# Patient Record
Sex: MALE | Race: BLACK OR AFRICAN AMERICAN | Employment: UNEMPLOYED | URBAN - METROPOLITAN AREA
[De-identification: names, ages, dates, MRNs, and addresses within clinical notes are randomized per-mention and may not be internally consistent; named-entity substitution may affect disease eponyms.]

---

## 2021-03-16 ENCOUNTER — HOSPITAL ENCOUNTER (EMERGENCY)
Age: 41
Discharge: HOME | End: 2021-03-16
Attending: EMERGENCY MEDICINE
Payer: COMMERCIAL

## 2021-03-16 VITALS
DIASTOLIC BLOOD PRESSURE: 87 MMHG | TEMPERATURE: 98.6 F | HEART RATE: 112 BPM | SYSTOLIC BLOOD PRESSURE: 154 MMHG | OXYGEN SATURATION: 98 % | RESPIRATION RATE: 18 BRPM

## 2021-03-16 DIAGNOSIS — Z91.199 NON COMPLIANCE WITH MEDICAL TREATMENT: Primary | ICD-10-CM

## 2021-03-16 DIAGNOSIS — F20.9 SCHIZOPHRENIA, UNSPECIFIED TYPE (HCC): ICD-10-CM

## 2021-03-16 DIAGNOSIS — F41.1 ANXIETY STATE: ICD-10-CM

## 2021-03-16 LAB
ACETAMINOPHEN LEVEL: <5 MCG/ML (ref 10–30)
ALBUMIN SERPL-MCNC: 4 G/DL (ref 3.5–5.2)
ALP BLD-CCNC: 118 U/L (ref 40–129)
ALT SERPL-CCNC: 11 U/L (ref 0–40)
AMPHETAMINE SCREEN, URINE: NOT DETECTED
ANION GAP SERPL CALCULATED.3IONS-SCNC: 11 MMOL/L (ref 7–16)
AST SERPL-CCNC: 20 U/L (ref 0–39)
BARBITURATE SCREEN URINE: NOT DETECTED
BASOPHILS ABSOLUTE: 0.04 E9/L (ref 0–0.2)
BASOPHILS RELATIVE PERCENT: 0.4 % (ref 0–2)
BENZODIAZEPINE SCREEN, URINE: NOT DETECTED
BILIRUB SERPL-MCNC: 0.8 MG/DL (ref 0–1.2)
BUN BLDV-MCNC: 12 MG/DL (ref 6–20)
CALCIUM SERPL-MCNC: 9 MG/DL (ref 8.6–10.2)
CANNABINOID SCREEN URINE: POSITIVE
CHLORIDE BLD-SCNC: 103 MMOL/L (ref 98–107)
CO2: 21 MMOL/L (ref 22–29)
COCAINE METABOLITE SCREEN URINE: NOT DETECTED
CREAT SERPL-MCNC: 1 MG/DL (ref 0.7–1.2)
EOSINOPHILS ABSOLUTE: 0.03 E9/L (ref 0.05–0.5)
EOSINOPHILS RELATIVE PERCENT: 0.3 % (ref 0–6)
ETHANOL: <10 MG/DL (ref 0–0.08)
FENTANYL SCREEN, URINE: NOT DETECTED
GFR AFRICAN AMERICAN: >60
GFR NON-AFRICAN AMERICAN: >60 ML/MIN/1.73
GLUCOSE BLD-MCNC: 128 MG/DL (ref 74–99)
HCT VFR BLD CALC: 39.3 % (ref 37–54)
HEMOGLOBIN: 12.3 G/DL (ref 12.5–16.5)
IMMATURE GRANULOCYTES #: 0.03 E9/L
IMMATURE GRANULOCYTES %: 0.3 % (ref 0–5)
LYMPHOCYTES ABSOLUTE: 1.42 E9/L (ref 1.5–4)
LYMPHOCYTES RELATIVE PERCENT: 15.6 % (ref 20–42)
Lab: ABNORMAL
MCH RBC QN AUTO: 23.7 PG (ref 26–35)
MCHC RBC AUTO-ENTMCNC: 31.3 % (ref 32–34.5)
MCV RBC AUTO: 75.9 FL (ref 80–99.9)
METHADONE SCREEN, URINE: NOT DETECTED
MONOCYTES ABSOLUTE: 0.83 E9/L (ref 0.1–0.95)
MONOCYTES RELATIVE PERCENT: 9.1 % (ref 2–12)
NEUTROPHILS ABSOLUTE: 6.76 E9/L (ref 1.8–7.3)
NEUTROPHILS RELATIVE PERCENT: 74.3 % (ref 43–80)
OPIATE SCREEN URINE: NOT DETECTED
OXYCODONE URINE: NOT DETECTED
PDW BLD-RTO: 13.9 FL (ref 11.5–15)
PHENCYCLIDINE SCREEN URINE: NOT DETECTED
PLATELET # BLD: 298 E9/L (ref 130–450)
PMV BLD AUTO: 9.5 FL (ref 7–12)
POTASSIUM REFLEX MAGNESIUM: 3.7 MMOL/L (ref 3.5–5)
RBC # BLD: 5.18 E12/L (ref 3.8–5.8)
SALICYLATE, SERUM: <0.3 MG/DL (ref 0–30)
SARS-COV-2, NAAT: NOT DETECTED
SODIUM BLD-SCNC: 135 MMOL/L (ref 132–146)
TOTAL PROTEIN: 7.3 G/DL (ref 6.4–8.3)
TRICYCLIC ANTIDEPRESSANTS SCREEN SERUM: NEGATIVE NG/ML
WBC # BLD: 9.1 E9/L (ref 4.5–11.5)

## 2021-03-16 PROCEDURE — 82077 ASSAY SPEC XCP UR&BREATH IA: CPT

## 2021-03-16 PROCEDURE — 80307 DRUG TEST PRSMV CHEM ANLYZR: CPT

## 2021-03-16 PROCEDURE — 99283 EMERGENCY DEPT VISIT LOW MDM: CPT

## 2021-03-16 PROCEDURE — 80143 DRUG ASSAY ACETAMINOPHEN: CPT

## 2021-03-16 PROCEDURE — 80053 COMPREHEN METABOLIC PANEL: CPT

## 2021-03-16 PROCEDURE — 87635 SARS-COV-2 COVID-19 AMP PRB: CPT

## 2021-03-16 PROCEDURE — 85025 COMPLETE CBC W/AUTO DIFF WBC: CPT

## 2021-03-16 PROCEDURE — 80179 DRUG ASSAY SALICYLATE: CPT

## 2021-03-16 PROCEDURE — 93005 ELECTROCARDIOGRAM TRACING: CPT

## 2021-03-16 ASSESSMENT — ENCOUNTER SYMPTOMS
RESPIRATORY NEGATIVE: 1
GASTROINTESTINAL NEGATIVE: 1

## 2021-03-16 NOTE — ED PROVIDER NOTES
medication, denies any symptoms, labs were done, and EKG which were in normal limits, rapid Covid was negative, patient will be discharged to crisis unit, to return to ED if symptoms worsen       Amount and/or Complexity of Data Reviewed  Clinical lab tests: reviewed  Tests in the radiology section of CPT®: reviewed  Tests in the medicine section of CPT®: reviewed    Patient Progress  Patient progress: stable       ED Course as of Mar 16 1501   Tue Mar 16, 2021   1011 Drug Screen Comment[de-identified] see below [BY]      ED Course User Index  [BY] Adilene Reid MD          ED Course as of Mar 16 1501   Tue Mar 16, 2021   1011 Drug Screen Comment[de-identified] see below [BY]      ED Course User Index  [BY] Adilene Reid MD       --------------------------------------------- PAST HISTORY ---------------------------------------------  Past Medical History:  has a past medical history of Schizophrenia ([facility] [address] Past Surgical History:  has no past surgical history on file. Social History:  reports that he has been smoking cigars. He has been smoking about 0.10 packs per day. He does not have any smokeless tobacco history on file. He reports that he does not drink alcohol or use drugs. Family History: family history is not on file. The patients home medications have been reviewed. Allergies: Patient has no known allergies.     -------------------------------------------------- RESULTS -------------------------------------------------  Labs:  Results for orders placed or performed during the hospital encounter of 03/16/21   URINE DRUG SCREEN   Result Value Ref Range    Drug Screen Comment: see below    EKG 12 Lead   Result Value Ref Range    Ventricular Rate 106 BPM    Atrial Rate 106 BPM    P-R Interval 140 ms    QRS Duration 78 ms    Q-T Interval 348 ms    QTc Calculation (Bazett) 462 ms    P Axis 73 degrees    R Axis 79 degrees    T Axis 4 degrees       Radiology:  No orders to display ------------------------- NURSING NOTES AND VITALS REVIEWED ---------------------------  Date / Time Roomed:  3/16/2021  8:16 AM  ED Bed Assignment:  [facility]    The nursing notes within the ED encounter and vital signs as below have been reviewed. Pulse 113   Temp 98.6 °F (37 °C)   SpO2 96%   Oxygen Saturation Interpretation: Normal      ------------------------------------------ PROGRESS NOTES ------------------------------------------  8:36 AM EDT  I have spoken with the patient and discussed todays results, in addition to providing specific details for the plan of care and counseling regarding the diagnosis and prognosis. Their questions are answered at this time and they are agreeable with the plan. I discussed at length with them reasons for immediate return here for re evaluation. They will followup with their primary care physician by calling their office tomorrow. --------------------------------- ADDITIONAL PROVIDER NOTES ---------------------------------  At this time the patient is without objective evidence of an acute process requiring hospitalization or inpatient management. They have remained hemodynamically stable throughout their entire ED visit and are stable for discharge with outpatient follow-up. The plan has been discussed in detail and they are aware of the specific conditions for emergent return, as well as the importance of follow-up. New Prescriptions    No medications on file       Diagnosis:  1. Non compliance with medical treatment    2. Sch[facility]ia, unspecified type ([facility]    3. Anxiety state        Disposition:  Patient's disposition: Discharge to crisis unit  Patient's condition is stable.          Geno Jose MD  Resident  03/16/21 8539

## 2021-03-16 NOTE — ED NOTES
PT HAS BEEN ACCEPTED TO U    TAXI CALLED TO [address] [facility] Liseth Castillo, [place]  03/16/21 5674

## 2021-03-16 NOTE — ED NOTES
Emergency Department [facility] AN AFFILIATE OF [facility] Biopsychosocial Assessment Note    Chief Complaint: pt sent here for psych meds and referral to crisis unit.  denies si hi    MSE:  PT's mood is anxious, cooperative, alert, oriented x's 3, shared fair eye contact, speech becomes intense when he is talking, excitable, communicates effectively, thoughts are lucid and overall stable, denies SI, denies HI, denies hallucinations, behavior is controlled. Clinical Summary/History:   PT is 47year old male, not , has no children, was living with his grandmother. PT was brought to the ER by the [facility] police with the intent to be assessed for CSU. PT denies SI, Denies HI not having any hallucinations. PT reports a MH hx of schizophrenia, currently has no MH services, last treating 1 year ago. It's unclear if pt is med compliant. He reported that he has been taking medications but that he called CSU prior to coming to the ER and was advised that receiving a monthly shot is is possible once he would get set up with services. PT is not pink slipped. PT identified no other stressors at this time. He wants to go to the crisis unit. PT goes to [facility] Doctor Luana Estrada is who he see's. Gender  [x] Male [] Female [] Transgender  [] Other    Sexual Orientation    [x] Heterosexual [] Homosexual [] Bisexual [] Other    Suicidal Behavioral: CSSR-S Complete. [] Reports:    [] Past [] Present   [x] Denies    Homicidal/ Violent Behavior  [] Reports:   [] Past [] Present   [x] Denies     Hallucinations/Delusions   [] Reports:   [x] Denies     Substance Use/Alcohol Use/Addiction: SBIRT Screen Complete.    [] Reports:   [x] Denies     Trauma History  [] Reports:  [x] Denies     Collateral Information:    Level of Care/Disposition Plan  [] Home:   [] Outpatient Provider:   [x] Crisis Unit:   [] Inpatient Psychiatric Unit:  [] Other:        Kizzy Raquel, [facility]  03/16/21 [address], [facility]  03/16/21 120[address] [place] [address], LSW  03/16/21 1035

## 2021-03-17 LAB
EKG ATRIAL RATE: 106 BPM
EKG P AXIS: 73 DEGREES
EKG P-R INTERVAL: 140 MS
EKG Q-T INTERVAL: 348 MS
EKG QRS DURATION: 78 MS
EKG QTC CALCULATION (BAZETT): 462 MS
EKG R AXIS: 79 DEGREES
EKG T AXIS: 4 DEGREES
EKG VENTRICULAR RATE: 106 BPM

## 2021-12-21 ENCOUNTER — HOSPITAL ENCOUNTER (INPATIENT)
Age: 41
LOS: 2 days | Discharge: ANOTHER HOSPITAL | DRG: 750 | End: 2021-12-23
Attending: EMERGENCY MEDICINE | Admitting: PSYCHIATRY & NEUROLOGY
Payer: COMMERCIAL

## 2021-12-21 DIAGNOSIS — Z86.59 HISTORY OF SCHIZOPHRENIA: Primary | ICD-10-CM

## 2021-12-21 PROBLEM — F20.9 ACUTE EXACERBATION OF CHRONIC SCHIZOPHRENIA (HCC): Status: ACTIVE | Noted: 2021-12-21

## 2021-12-21 LAB
ACETAMINOPHEN LEVEL: <5 MCG/ML (ref 10–30)
ALBUMIN SERPL-MCNC: 4 G/DL (ref 3.5–5.2)
ALP BLD-CCNC: 109 U/L (ref 40–129)
ALT SERPL-CCNC: 15 U/L (ref 0–40)
AMPHETAMINE SCREEN, URINE: NOT DETECTED
ANION GAP SERPL CALCULATED.3IONS-SCNC: 13 MMOL/L (ref 7–16)
AST SERPL-CCNC: 18 U/L (ref 0–39)
ATYPICAL LYMPHOCYTE RELATIVE PERCENT: 2.6 % (ref 0–4)
BARBITURATE SCREEN URINE: NOT DETECTED
BASOPHILS ABSOLUTE: 0 E9/L (ref 0–0.2)
BASOPHILS RELATIVE PERCENT: 0.5 % (ref 0–2)
BENZODIAZEPINE SCREEN, URINE: NOT DETECTED
BILIRUB SERPL-MCNC: 0.6 MG/DL (ref 0–1.2)
BUN BLDV-MCNC: 21 MG/DL (ref 6–20)
CALCIUM SERPL-MCNC: 9.2 MG/DL (ref 8.6–10.2)
CANNABINOID SCREEN URINE: POSITIVE
CHLORIDE BLD-SCNC: 100 MMOL/L (ref 98–107)
CO2: 24 MMOL/L (ref 22–29)
COCAINE METABOLITE SCREEN URINE: NOT DETECTED
CREAT SERPL-MCNC: 0.9 MG/DL (ref 0.7–1.2)
EOSINOPHILS ABSOLUTE: 0.11 E9/L (ref 0.05–0.5)
EOSINOPHILS RELATIVE PERCENT: 2.6 % (ref 0–6)
ETHANOL: <10 MG/DL (ref 0–0.08)
FENTANYL SCREEN, URINE: NOT DETECTED
GFR AFRICAN AMERICAN: >60
GFR NON-AFRICAN AMERICAN: >60 ML/MIN/1.73
GLUCOSE BLD-MCNC: 82 MG/DL (ref 74–99)
HCT VFR BLD CALC: 37.4 % (ref 37–54)
HEMOGLOBIN: 11.9 G/DL (ref 12.5–16.5)
INFLUENZA A: NOT DETECTED
INFLUENZA B: NOT DETECTED
LYMPHOCYTES ABSOLUTE: 1.45 E9/L (ref 1.5–4)
LYMPHOCYTES RELATIVE PERCENT: 30.5 % (ref 20–42)
Lab: ABNORMAL
MCH RBC QN AUTO: 24.1 PG (ref 26–35)
MCHC RBC AUTO-ENTMCNC: 31.8 % (ref 32–34.5)
MCV RBC AUTO: 75.9 FL (ref 80–99.9)
METHADONE SCREEN, URINE: NOT DETECTED
MONOCYTES ABSOLUTE: 0.22 E9/L (ref 0.1–0.95)
MONOCYTES RELATIVE PERCENT: 5.2 % (ref 2–12)
NEUTROPHILS ABSOLUTE: 2.6 E9/L (ref 1.8–7.3)
NEUTROPHILS RELATIVE PERCENT: 59.1 % (ref 43–80)
OPIATE SCREEN URINE: NOT DETECTED
OXYCODONE URINE: NOT DETECTED
PDW BLD-RTO: 14.2 FL (ref 11.5–15)
PHENCYCLIDINE SCREEN URINE: NOT DETECTED
PLATELET # BLD: 275 E9/L (ref 130–450)
PMV BLD AUTO: 9.1 FL (ref 7–12)
POTASSIUM SERPL-SCNC: 3.8 MMOL/L (ref 3.5–5)
RBC # BLD: 4.93 E12/L (ref 3.8–5.8)
SALICYLATE, SERUM: <0.3 MG/DL (ref 0–30)
SARS-COV-2 RNA, RT PCR: DETECTED
SODIUM BLD-SCNC: 137 MMOL/L (ref 132–146)
TOTAL PROTEIN: 7.5 G/DL (ref 6.4–8.3)
TRICYCLIC ANTIDEPRESSANTS SCREEN SERUM: NEGATIVE NG/ML
WBC # BLD: 4.4 E9/L (ref 4.5–11.5)

## 2021-12-21 PROCEDURE — 6370000000 HC RX 637 (ALT 250 FOR IP): Performed by: PSYCHIATRY & NEUROLOGY

## 2021-12-21 PROCEDURE — 87636 SARSCOV2 & INF A&B AMP PRB: CPT

## 2021-12-21 PROCEDURE — 80053 COMPREHEN METABOLIC PANEL: CPT

## 2021-12-21 PROCEDURE — 85025 COMPLETE CBC W/AUTO DIFF WBC: CPT

## 2021-12-21 PROCEDURE — 80307 DRUG TEST PRSMV CHEM ANLYZR: CPT

## 2021-12-21 PROCEDURE — 1240000000 HC EMOTIONAL WELLNESS R&B

## 2021-12-21 PROCEDURE — 80179 DRUG ASSAY SALICYLATE: CPT

## 2021-12-21 PROCEDURE — 80143 DRUG ASSAY ACETAMINOPHEN: CPT

## 2021-12-21 PROCEDURE — 82077 ASSAY SPEC XCP UR&BREATH IA: CPT

## 2021-12-21 PROCEDURE — 93005 ELECTROCARDIOGRAM TRACING: CPT | Performed by: EMERGENCY MEDICINE

## 2021-12-21 PROCEDURE — 99284 EMERGENCY DEPT VISIT MOD MDM: CPT

## 2021-12-21 RX ORDER — ACETAMINOPHEN 325 MG/1
650 TABLET ORAL EVERY 6 HOURS PRN
Status: DISCONTINUED | OUTPATIENT
Start: 2021-12-21 | End: 2021-12-23 | Stop reason: HOSPADM

## 2021-12-21 RX ORDER — TRAZODONE HYDROCHLORIDE 50 MG/1
50 TABLET ORAL NIGHTLY PRN
Status: DISCONTINUED | OUTPATIENT
Start: 2021-12-21 | End: 2021-12-23 | Stop reason: HOSPADM

## 2021-12-21 RX ORDER — NICOTINE 21 MG/24HR
1 PATCH, TRANSDERMAL 24 HOURS TRANSDERMAL DAILY
Status: DISCONTINUED | OUTPATIENT
Start: 2021-12-21 | End: 2021-12-23 | Stop reason: HOSPADM

## 2021-12-21 RX ORDER — HYDROXYZINE PAMOATE 50 MG/1
50 CAPSULE ORAL 3 TIMES DAILY PRN
Status: DISCONTINUED | OUTPATIENT
Start: 2021-12-21 | End: 2021-12-23 | Stop reason: HOSPADM

## 2021-12-21 RX ORDER — MAGNESIUM HYDROXIDE/ALUMINUM HYDROXICE/SIMETHICONE 120; 1200; 1200 MG/30ML; MG/30ML; MG/30ML
30 SUSPENSION ORAL PRN
Status: DISCONTINUED | OUTPATIENT
Start: 2021-12-21 | End: 2021-12-23 | Stop reason: HOSPADM

## 2021-12-21 RX ORDER — DIPHENHYDRAMINE HYDROCHLORIDE 50 MG/ML
50 INJECTION INTRAMUSCULAR; INTRAVENOUS EVERY 6 HOURS PRN
Status: DISCONTINUED | OUTPATIENT
Start: 2021-12-21 | End: 2021-12-23 | Stop reason: HOSPADM

## 2021-12-21 RX ORDER — LORAZEPAM 1 MG/1
2 TABLET ORAL EVERY 6 HOURS PRN
Status: DISCONTINUED | OUTPATIENT
Start: 2021-12-21 | End: 2021-12-23 | Stop reason: HOSPADM

## 2021-12-21 RX ORDER — HALOPERIDOL 5 MG/ML
5 INJECTION INTRAMUSCULAR EVERY 6 HOURS PRN
Status: DISCONTINUED | OUTPATIENT
Start: 2021-12-21 | End: 2021-12-23 | Stop reason: HOSPADM

## 2021-12-21 RX ORDER — HALOPERIDOL 5 MG
5 TABLET ORAL EVERY 6 HOURS PRN
Status: DISCONTINUED | OUTPATIENT
Start: 2021-12-21 | End: 2021-12-23 | Stop reason: HOSPADM

## 2021-12-21 RX ORDER — RISPERIDONE 1 MG/1
1 TABLET, FILM COATED ORAL 2 TIMES DAILY
Status: DISCONTINUED | OUTPATIENT
Start: 2021-12-21 | End: 2021-12-22

## 2021-12-21 RX ORDER — LORAZEPAM 2 MG/ML
2 INJECTION INTRAMUSCULAR EVERY 6 HOURS PRN
Status: DISCONTINUED | OUTPATIENT
Start: 2021-12-21 | End: 2021-12-23 | Stop reason: HOSPADM

## 2021-12-21 RX ORDER — DIPHENHYDRAMINE HCL 25 MG
25 TABLET ORAL EVERY 6 HOURS PRN
Status: DISCONTINUED | OUTPATIENT
Start: 2021-12-21 | End: 2021-12-23 | Stop reason: HOSPADM

## 2021-12-21 RX ORDER — DIVALPROEX SODIUM 250 MG/1
250 TABLET, DELAYED RELEASE ORAL 2 TIMES DAILY
Status: DISCONTINUED | OUTPATIENT
Start: 2021-12-21 | End: 2021-12-22

## 2021-12-21 RX ADMIN — ACETAMINOPHEN 650 MG: 325 TABLET ORAL at 17:22

## 2021-12-21 RX ADMIN — DIVALPROEX SODIUM 250 MG: 250 TABLET, DELAYED RELEASE ORAL at 20:05

## 2021-12-21 ASSESSMENT — PAIN DESCRIPTION - LOCATION: LOCATION: TOE (COMMENT WHICH ONE)

## 2021-12-21 ASSESSMENT — PAIN DESCRIPTION - DESCRIPTORS: DESCRIPTORS: ACHING

## 2021-12-21 ASSESSMENT — PAIN SCALES - GENERAL
PAINLEVEL_OUTOF10: 6
PAINLEVEL_OUTOF10: 0

## 2021-12-21 ASSESSMENT — PAIN DESCRIPTION - PAIN TYPE: TYPE: ACUTE PAIN

## 2021-12-21 NOTE — ED PROVIDER NOTES
HPI:  12/21/21, Time: 3:05 AM BACILIO Miles is a 46 y.o. male presenting to the ED for medical clearance, beginning 1 day ago. The complaint has been persistent, mild in severity, and worsened by nothing. Patient reports that he lives with his roommate. Patient reports having argument with his roommate. Patient reporting that he was told by police to come to the hospital.  Patient reporting no homicidal suicidal thoughts patient reporting no hallucinations. Patient does have history of schizophrenia has been off his medications. Patient reporting no fever no chills no chest pain or abdominal pain he reports no fall or injury    ROS:   Pertinent positives and negatives are stated within HPI, all other systems reviewed and are negative.  --------------------------------------------- PAST HISTORY ---------------------------------------------  Past Medical History:  has a past medical history of Schizophrenia ([facility] [address] Past Surgical History:  has no past surgical history on file. Social History:  reports that he has been smoking cigars. He has been smoking about 0.10 packs per day. He does not have any smokeless tobacco history on file. He reports that he does not drink alcohol and does not use drugs. Family History: family history is not on file. The patients home medications have been reviewed. Allergies: Patient has no known allergies. ---------------------------------------------------PHYSICAL EXAM--------------------------------------    Constitutional/General: Alert and oriented x3, well appearing, non toxic in NAD  Head: Normocephalic and atraumatic  Eyes: PERRL, EOMI  Mouth: Oropharynx clear, handling secretions, no trismus  Neck: Supple, full ROM, non tender to palpation in the midline, no stridor, no crepitus, no meningeal signs  Pulmonary: Lungs clear to auscultation bilaterally, no wheezes, rales, or rhonchi. Not in respiratory distress  Cardiovascular:  Regular rate. Regular rhythm. No murmurs, gallops, or rubs. 2+ distal pulses  Chest: no chest wall tenderness  Abdomen: Soft. Non tender. Non distended. +BS. No rebound, guarding, or rigidity. No pulsatile masses appreciated. Musculoskeletal: Moves all extremities x 4. Warm and well perfused, no clubbing, cyanosis, or edema. Capillary refill <3 seconds  Skin: warm and dry. No rashes. Neurologic: GCS 15, CN 2-12 grossly intact, no focal deficits, symmetric strength 5/5 in the upper and lower extremities bilaterally  Psych: Not homicidal not suicidal no hallucinations    -------------------------------------------------- RESULTS -------------------------------------------------  I have personally reviewed all laboratory and imaging results for this patient. Results are listed below.      LABS:  Results for orders placed or performed during the hospital encounter of 12/21/21   CBC auto differential   Result Value Ref Range    WBC 4.4 (L) 4.5 - 11.5 E9/L    RBC 4.93 3.80 - 5.80 E12/L    Hemoglobin 11.9 (L) 12.5 - 16.5 g/dL    Hematocrit 37.4 37.0 - 54.0 %    MCV 75.9 (L) 80.0 - 99.9 fL    MCH 24.1 (L) 26.0 - 35.0 pg    MCHC 31.8 (L) 32.0 - 34.5 %    RDW 14.2 11.5 - 15.0 fL    Platelets 598 704 - 757 E9/L    MPV 9.1 7.0 - 12.0 fL    Neutrophils % 59.1 43.0 - 80.0 %    Lymphocytes % 30.5 20.0 - 42.0 %    Monocytes % 5.2 2.0 - 12.0 %    Eosinophils % 2.6 0.0 - 6.0 %    Basophils % 0.5 0.0 - 2.0 %    Neutrophils Absolute 2.60 1.80 - 7.30 E9/L    Lymphocytes Absolute 1.45 (L) 1.50 - 4.00 E9/L    Monocytes Absolute 0.22 0.10 - 0.95 E9/L    Eosinophils Absolute 0.11 0.05 - 0.50 E9/L    Basophils Absolute 0.00 0.00 - 0.20 E9/L    Atypical Lymphocytes Relative 2.6 0.0 - 4.0 %   Comprehensive Metabolic Panel   Result Value Ref Range    Sodium 137 132 - 146 mmol/L    Potassium 3.8 3.5 - 5.0 mmol/L    Chloride 100 98 - 107 mmol/L    CO2 24 22 - 29 mmol/L    Anion Gap 13 7 - 16 mmol/L    Glucose 82 74 - 99 mg/dL    BUN 21 (H) 6 - 20 mg/dL CREATININE 0.9 0.7 - 1.2 mg/dL    GFR Non-African American >60 >=60 mL/min/1.73    GFR African American >60     Calcium 9.2 8.6 - 10.2 mg/dL    Total Protein 7.5 6.4 - 8.3 g/dL    Albumin 4.0 3.5 - 5.2 g/dL    Total Bilirubin 0.6 0.0 - 1.2 mg/dL    Alkaline Phosphatase 109 40 - 129 U/L    ALT 15 0 - 40 U/L    AST 18 0 - 39 U/L   Serum Drug Screen   Result Value Ref Range    Ethanol Lvl <10 mg/dL    Acetaminophen Level <5.0 (L) 10.0 - 31.9 mcg/mL    Salicylate, Serum <8.9 0.0 - 30.0 mg/dL    TCA Scrn NEGATIVE Cutoff:300 ng/mL   Urine Drug Screen   Result Value Ref Range    Amphetamine Screen, Urine NOT DETECTED Negative <1000 ng/mL    Barbiturate Screen, Ur NOT DETECTED Negative < 200 ng/mL    Benzodiazepine Screen, Urine NOT DETECTED Negative < 200 ng/mL    Cannabinoid Scrn, Ur POSITIVE (A) Negative < 50ng/mL    Cocaine Metabolite Screen, Urine NOT DETECTED Negative < 300 ng/mL    Opiate Scrn, Ur NOT DETECTED Negative < 300ng/mL    PCP Screen, Urine NOT DETECTED Negative < 25 ng/mL    Methadone Screen, Urine NOT DETECTED Negative <300 ng/mL    Oxycodone Urine NOT DETECTED Negative <100 ng/mL    FENTANYL SCREEN, URINE NOT DETECTED Negative <1 ng/mL    Drug Screen Comment: see below        RADIOLOGY:  Interpreted by Radiologist.  No orders to display               ------------------------- NURSING NOTES AND VITALS REVIEWED ---------------------------   The nursing notes within the ED encounter and vital signs as below have been reviewed by myself. BP (!) 156/102   Pulse 100   Temp 97.7 °F (36.5 °C) (Oral)   Resp 16   Ht 5' 8\" (1.727 m)   Wt 190 lb (86.2 kg)   SpO2 99%   BMI 28.89 kg/m²   Oxygen Saturation Interpretation: Normal    The patients available past medical records and past encounters were reviewed.         ------------------------------ ED COURSE/MEDICAL DECISION MAKING----------------------  Medications - No data to display          Medical Decision Making:    Patient presenting here because of medical clearance. Patient reports that he wants to go to crisis unit. Patient does have history of schizophrenia. Patient reporting he is on no medications. Patient reporting no homicidal suicidal thoughts. Patient reports he had disagreement through May and he was told by police to leave the apartment and come here to the hospital.  Patient reporting no physical malaise chest pain shortness of breath or abdominal pain. Patient labs noted reviewed. Patient is medically clear for     Re-Evaluations:             Re-evaluation. Patients symptoms show no change      Consultations:                 Critical Care: This patient's ED course included: a personal history and physicial eaxmination    This patient has been closely monitored during their ED course. Counseling: The emergency provider has spoken with the patient and discussed todays results, in addition to providing specific details for the plan of care and counseling regarding the diagnosis and prognosis. Questions are answered at this time and they are agreeable with the plan.       --------------------------------- IMPRESSION AND DISPOSITION ---------------------------------    IMPRESSION  1. History of schizophrenia        DISPOSITION  Disposition:  to evaluate  Patient condition is stable        NOTE: This report was transcribed using voice recognition software.  Every effort was made to ensure accuracy; however, inadvertent computerized transcription errors may be present          Jennifer Mike MD  12/21/21 1978

## 2021-12-21 NOTE — ED NOTES
Patient arrived on unit, very impolite with SHIRLEY, requesting someone else assist him due to him not liking chinese people. SHIRLEY was telling patient that he needed to change into hospital attire. BILL QUESADA informed patient that he needs to change into hospital attire. Patient informed he was here to go to crises unit. BILL QUESADA informed he still needs to change into hospital attire. Patient stated, \"Well why didn't you tell me that? \". Patient then proceeded to talk to unseen others as he changed into hospital attire.          Alcides Cowan, [place], [place]  12/21/21 6828

## 2021-12-21 NOTE — ED NOTES
Bed: [facility]  Expected date:   Expected time:   Means of arrival:   Comments:  maninder Aguilar RN  12/21/21 9437

## 2021-12-21 NOTE — ED NOTES
Emergency Department [facility] AN AFFILIATE OF [facility] Biopsychosocial Assessment Note     met with patient to complete biopsychosocial assessment, CSSRS and SBIRT    Chief Complaint:     Patient is a 47 yo male presenting to the ED voluntarily as a walk in. Patient states he needs somewhere to go and needs medical clearance for the crisis unit. state that he is going there because he needs somewhere to stay and he has been there before after issues with room mate but denies SI/HI or drug issues     Patient reports the police came to his house over a disagreement with his roommate and since the apartment is in his roommates name patient was asked to leave. Police suggested he go to [employer] or [facility]ises [facility]. Police took Patient to [facility] Unit and they told patient to come to the ED for medical clearance. Patient denies SI, HI and AVH. Patient told this SW multiple times that \"I\" sounded like a strong man and \"I\" should not talk to him like a strong man. Patient informed \"I\" should talk to him like a girl talking to a man. Patient informed that questions did not make sense to him since he only wanted to go to crises unit. Patient did not answer questions asked on the first attempt, patient had to be asked multiple times for him to answer the question asked instead of going off in a different question. Patient denies AOD    MSE:    Patient is oriented x 4 and alert, Affect is labile, mood is anxious, guarded, defensive,internally stimulated, paranoid, bizarre, flight of ideas, patient denies SI, HI and AVH, poor historian, reports good sleep and appetite. Clinical Summary/History:     Patient reports a past hx of schizophrenia, reports his doctors in [facility] took him off psych meds 2-3 years ago stating he no longer needed them that he only needs to take \"tremie's\", Tramadol and Tylenol. Patient reports he has not been on meds since and not connected with outpatient [place] [address] services.  Patient reports when he came to [place] 2[place]3 years ago he was admitted to the Crises unit where he was given Tramadol and Tylenol , reviewing patient chart this was in March of this year. Patient has asked SW a few times to get him some \"tremies\"    Gender  [x] Male [] Female [] Transgender  [] Other    Sexual Orientation    [] Heterosexual [] Homosexual [] Bisexual [] Other  Unknown    Suicidal Behavioral: CSSR-S Complete. [] Reports:    [] Past [] Present   [x] Denies    Homicidal/ Violent Behavior  [] Reports:   [] Past [] Present   [x] Denies     Violence Risk Screenin. Have you ever engaged in a physical fight? []  Yes [x]  No  2. Have you ever had an order of protection taken out against you? []  Yes [x]  No  3. Have you ever been arrested due to violence? []  Yes [x]  No  4. Have you ever been cruel to animals? []  Yes [x]  No    If any of the above questions are affirmative, please complete these questions:  1. Have you ever thought about hurting someone? []  No  []  Yes (Ask the questions listed below)   When?  Did you follow through with the thoughts? []  No  []  Yes - What happened? 2.  Have you ever threatened anyone? []  No  []  Yes (Ask the questions listed below)   When and what happened?  Have you ever threatened someone with a gun, knife or other weapon? []  No  []  Yes - When and what happened? 3. Have you ever physically hurt someone? []  No  []  Yes (Ask the questions listed below)   When and what happened?  How many times have you physically hurt someone in the past?    Hallucinations/Delusions   [] Reports:   [x] Denies  Patient has been talking to unseen others since arriving on unit    Substance Use/Alcohol Use/Addiction: SBIRT Screen Complete.    [] Reports:   [x] Denies , positive for marijuana    Trauma History  [] Reports:  Unknown  [] Denies     Collateral Information:     No collateral information obtained at this time    Level of Care/Disposition Plan  [] Home:   [] Outpatient Provider: [] Crisis Unit:   [x] Inpatient Psychiatric Unit:  [] Other:     BILL SW will pursue inpatient admission.  Patient has been pink slipped by ED Doc     [place] Horta, MARICRUZ  12/21/21 Carry [place], MARICRUZ  12/21/21 0700

## 2021-12-21 NOTE — ED NOTES
Charge nurse made aware of need for Risperdal and Depakote prior to admission to the floor.      Patience Agudelo RN  12/21/21 2924

## 2021-12-21 NOTE — ED NOTES
pt alert oriented skin warm dry resp easy pt states had an argument with roommate and was kicked out of house now pt wants to go to crisis unit pt denies suicidal ideations has rapid speech and flight of ideas pt is cooperative in control has fair eye contact flat affect internally stimulated pt speaking to people notpresent     Marta Day, RN  12/21/21 [address], RN  12/21/21 5603

## 2021-12-21 NOTE — ED NOTES
Charge nurse informed that patient has tested positive for COVID and this writer requested patient be moved out of [facility]. Charge nurse actively looking for a bed and will inform this writer when a bed is available.       Rahul Bustillo RN  12/21/21 9588

## 2021-12-22 PROCEDURE — 6370000000 HC RX 637 (ALT 250 FOR IP): Performed by: PSYCHIATRY & NEUROLOGY

## 2021-12-22 PROCEDURE — 99222 1ST HOSP IP/OBS MODERATE 55: CPT | Performed by: NURSE PRACTITIONER

## 2021-12-22 PROCEDURE — 1240000000 HC EMOTIONAL WELLNESS R&B

## 2021-12-22 RX ORDER — OLANZAPINE 5 MG/1
5 TABLET ORAL NIGHTLY
Status: DISCONTINUED | OUTPATIENT
Start: 2021-12-22 | End: 2021-12-23 | Stop reason: HOSPADM

## 2021-12-22 RX ORDER — DIVALPROEX SODIUM 500 MG/1
500 TABLET, DELAYED RELEASE ORAL 2 TIMES DAILY
Status: DISCONTINUED | OUTPATIENT
Start: 2021-12-22 | End: 2021-12-23 | Stop reason: HOSPADM

## 2021-12-22 RX ADMIN — ACETAMINOPHEN 650 MG: 325 TABLET ORAL at 06:09

## 2021-12-22 RX ADMIN — DIVALPROEX SODIUM 250 MG: 250 TABLET, DELAYED RELEASE ORAL at 09:46

## 2021-12-22 ASSESSMENT — PAIN SCALES - GENERAL
PAINLEVEL_OUTOF10: 3
PAINLEVEL_OUTOF10: 4
PAINLEVEL_OUTOF10: 7
PAINLEVEL_OUTOF10: 0

## 2021-12-22 ASSESSMENT — SLEEP AND FATIGUE QUESTIONNAIRES
DO YOU USE A SLEEP AID: NO
AVERAGE NUMBER OF SLEEP HOURS: 8
DO YOU HAVE DIFFICULTY SLEEPING: NO
DO YOU USE A SLEEP AID: NO
DO YOU HAVE DIFFICULTY SLEEPING: NO
AVERAGE NUMBER OF SLEEP HOURS: 10

## 2021-12-22 ASSESSMENT — LIFESTYLE VARIABLES
HISTORY_ALCOHOL_USE: NO
HISTORY_ALCOHOL_USE: NO

## 2021-12-22 NOTE — PLAN OF CARE
[address] [facility]  Initial Interdisciplinary Treatment Plan NOTE    Review Date & Time: 12/22/2021 0900    Patient was in treatment team    Admission Type:   Admission Type: Involuntary    Reason for admission:  Reason for Admission: \"I have pain in my leg\"      Estimated Length of Stay Update:  3-5 days  Estimated Discharge Date Update: 12/27/2021    EDUCATION:   Learner Progress Toward Treatment Goals: Reviewed results and recommendations of this team    Method: Individual    Outcome: Verbalized understanding    PATIENT GOALS: Find housing    PLAN/TREATMENT RECOMMENDATIONS UPDATE: Encourage participation in discharge planning and continue to provide emotional support.     GOALS UPDATE:   Time frame for Short-Term Goals: 1-3 days    Autumn Watt RN

## 2021-12-22 NOTE — BH NOTE
This patient is a psychiatric inpatient being cared for in an acute care bed because of capacity or other exigent circumstances related to the COVID-19 emergency. Patient assessed for admission. Appeared flat, blunt, avoided eye contact, gave yes/no answers only. During conversation, patient evasive and appeared to be lethargic. Patient reported to this RN that his reason for admission was because his legs hurt. Patient denied SI/HI/AVH and depression/anxiety. Patient also denied past suicide attempts/thoughts and psychiatric treatment, which is incongruent from previous  note stating he was previously on psychiatric medications. Patient denies ETOH and drug use, however was positive for cannabis. [facility]  Admission Note     Admission Type:   Admission Type:  Involuntary    Reason for admission:  Reason for Admission: \"I have pain in my leg\"    PATIENT STRENGTHS:  Strengths: No significant Physical Illness    Patient Strengths and Limitations:  Limitations: Lacks leisure interests,Demonstrates discomfort with /lack of social skills    Addictive Behavior:   Addictive Behavior  In the past 3 months, have you felt or has someone told you that you have a problem with:  : None  Do you have a history of Chemical Use?: No  Do you have a history of Alcohol Use?: No  Do you have a history of Street Drug Abuse?: No  Histroy of Prescripton Drug Abuse?: No    Medical Problems:   Past Medical History:   Diagnosis Date    Schizophrenia ([facility] [address]        Status EXAM:  Status and Exam  Normal: No  Facial Expression: Expressionless,Flat  Affect: Congruent  Level of Consciousness: Alert  Mood:Normal: No  Mood: Depressed,Empty  Motor Activity:Normal: No  Motor Activity: Decreased  Interview Behavior: Evasive,Uncooperative/Withdrawn  Preception: [place] to Person,[place] to Place  Attention:Normal: No  Attention: Unable to Concentrate  Thought Processes: Blocking  Thought Content:Normal: Yes  Hallucinations: None  Delusions: No  Memory:Normal: No  Memory: Poor Recent,Poor Remote  Insight and Judgment: No  Insight and Judgment: Poor Judgment,Poor Insight  Present Suicidal Ideation: No  Present Homicidal Ideation: No    Tobacco Screening:  Practical Counseling, on admission, aaron X, if applicable and completed (first 3 are required if patient doesn't refuse):            ( )  Recognizing danger situations (included triggers and roadblocks)                    ( )  Coping skills (new ways to manage stress, exercise, relaxation techniques, changing routine, distraction)                                                           ( )  Basic information about quitting (benefits of quitting, techniques in how to quit, available resources  ( ) Referral for counseling faxed to [facility]                                           ( ) Patient refused counseling  (X) Patient has not smoked in the last 30 days    Metabolic Screening:    No results found for: LABA1C    No results found for: CHOL  No results found for: TRIG  No results found for: HDL  No components found for: LDLCAL  No results found for: LABVLDL      Body mass index is 28.89 kg/m². BP Readings from Last 2 Encounters:   12/21/21 121/75   03/16/21 (!) 154/87           Pt admitted with followings belongings:  Dental Appliances: None  Vision - Corrective Lenses: None  Hearing Aid: None  Jewelry: Necklace (5 necklaces)  Body Piercings Removed: No  Clothing: Autumn Schanz / coat,Pants,Shirt,Undergarments (Comment),Socks  Were All Patient Medications Collected?: Not Applicable  Other Valuables: Cell phone     Patient oriented to surroundings and program expectations and copy of patient rights given. Received admission packet:  yes. Consents reviewed, signed yes. Patient verbalize understanding:  yes.   Patient education on precautions: yes                   Heladio Morgan RN

## 2021-12-22 NOTE — PLAN OF CARE
Pt denies SI HI and hallucinations. Pt is pleasant, congruent. States he just needs help with getting his own apartment. Pt resistant to certain medications but open to suggestions. Pt agreeable to [facility] at discharge if that is the plan. Pt is medication compliant. No voiced depression or anxiety, paranoia or delusions. Pt is eating provided meals. No groups d/t isolation precautions. We will continue to provide support and comfort for the patient.      Problem: Altered Mood, Depressive Behavior:  Goal: Able to verbalize acceptance of life and situations over which he or she has no control  Description: Able to verbalize acceptance of life and situations over which he or she has no control  Outcome: Met This Shift     Problem: Altered Mood, Depressive Behavior:  Goal: Able to verbalize support systems  Description: Able to verbalize support systems  Outcome: Met This Shift     Problem: Altered Mood, Depressive Behavior:  Goal: Absence of self-harm  Description: Absence of self-harm  Outcome: Met This Shift

## 2021-12-22 NOTE — CARE COORDINATION
Biopsychosocial Assessment Note    Social work met with patient to complete the biopsychosocial assessment and CSSR-S. Mental Status Exam: Pt appeared to be alert and oriented x 4. Pt was friendly and cooperative for the most part, but could be evasive at times. Pt's thought process was preoccupied, speech was clear. Affect was flat. Chief Complaint: \"Patient is a 47 yo male presenting to the ED voluntarily as a walk in. Patient states he needs somewhere to go and needs medical clearance for the crisis unit. state that he is going there because he needs somewhere to stay and he has been there before after issues with room mate but denies SI/HI or drug issues \"    Patient Report: Pt states that this is his first admission to a psychiatric facility. Pt states that him and his roommate got into a verbal alteration, and his roommate kicked him out. Pt stated that he wanted to go to [facility], with the hopes of getting into a group home or his own apartment. Pt states that he wanted to be set up with an outpatient  to assist him. Pt states that he came to the hospital to get medically cleared, which is when he found out he had COVID. Pt states that he only wants to go to [facility] at discharge, and will not consider [facility]. Pt stated \"I don't get along with black people to be honest. Especially not ones from the [place]. \" Pt denied lifetime suicide attempts. Pt currently denying SI/ HI/ hallucinations/ delusions. Pt denied trauma history. Pt denied substance use, but did test positive for marijuana.     Gender  [x] Male [] Female [] Transgender  [] Other    Sexual Orientation    [x] Heterosexual [] Homosexual [] Bisexual [] Other    Suicidal Ideation  [] Past [] Present [x] Denies     Homicidal Ideation  [] Past [] Present [x] Denies     Hallucinations/Delusions (Specify type)  [] Reports [x] Denies     Substance Use/Alcohol Use/Addiction  [] Reports [x] Denies     Tobacco Use (within the last 6 months)  [x] Reports [] Denies     Trauma History  [] Reports [x] Denies     Collateral Contact (JUAN signed)  Name:   Relationship:  Number:     Collateral Information: Pt states he does not have anyone that knows and supports him       Access to Weapons per Collateral Contact: [] Reports [x] Denies       Follow up provider preference: [address] for discharge  Location (where do they plan on discharging to?): [facility]    Transportation (who will pick them up at discharge?) [facility]     Medications (will they have money for copays at discharge?): Pt unsure if he will be able to pay it, pt does have Medicaid

## 2021-12-23 VITALS
TEMPERATURE: 97.9 F | WEIGHT: 190 LBS | BODY MASS INDEX: 28.79 KG/M2 | DIASTOLIC BLOOD PRESSURE: 77 MMHG | SYSTOLIC BLOOD PRESSURE: 137 MMHG | RESPIRATION RATE: 16 BRPM | OXYGEN SATURATION: 100 % | HEART RATE: 83 BPM | HEIGHT: 68 IN

## 2021-12-23 LAB
AMPHETAMINE SCREEN, URINE: NOT DETECTED
BARBITURATE SCREEN URINE: NOT DETECTED
BENZODIAZEPINE SCREEN, URINE: NOT DETECTED
CANNABINOID SCREEN URINE: POSITIVE
COCAINE METABOLITE SCREEN URINE: NOT DETECTED
EKG ATRIAL RATE: 114 BPM
EKG P AXIS: 63 DEGREES
EKG P-R INTERVAL: 134 MS
EKG Q-T INTERVAL: 320 MS
EKG QRS DURATION: 80 MS
EKG QTC CALCULATION (BAZETT): 441 MS
EKG R AXIS: 46 DEGREES
EKG T AXIS: 53 DEGREES
EKG VENTRICULAR RATE: 114 BPM
FENTANYL SCREEN, URINE: NOT DETECTED
Lab: ABNORMAL
METHADONE SCREEN, URINE: NOT DETECTED
OPIATE SCREEN URINE: NOT DETECTED
OXYCODONE URINE: NOT DETECTED
PHENCYCLIDINE SCREEN URINE: NOT DETECTED

## 2021-12-23 PROCEDURE — 6370000000 HC RX 637 (ALT 250 FOR IP): Performed by: NURSE PRACTITIONER

## 2021-12-23 PROCEDURE — 99239 HOSP IP/OBS DSCHRG MGMT >30: CPT | Performed by: NURSE PRACTITIONER

## 2021-12-23 PROCEDURE — 6370000000 HC RX 637 (ALT 250 FOR IP): Performed by: PSYCHIATRY & NEUROLOGY

## 2021-12-23 PROCEDURE — 93010 ELECTROCARDIOGRAM REPORT: CPT | Performed by: INTERNAL MEDICINE

## 2021-12-23 PROCEDURE — 80307 DRUG TEST PRSMV CHEM ANLYZR: CPT

## 2021-12-23 RX ORDER — NAPROXEN 250 MG/1
250 TABLET ORAL 2 TIMES DAILY WITH MEALS
Qty: 10 TABLET | Refills: 0 | Status: SHIPPED | OUTPATIENT
Start: 2021-12-23 | End: 2021-12-28

## 2021-12-23 RX ORDER — NICOTINE 21 MG/24HR
1 PATCH, TRANSDERMAL 24 HOURS TRANSDERMAL DAILY
Qty: 30 PATCH | Refills: 3 | Status: SHIPPED | OUTPATIENT
Start: 2021-12-24

## 2021-12-23 RX ORDER — NAPROXEN 250 MG/1
250 TABLET ORAL 2 TIMES DAILY WITH MEALS
Status: DISCONTINUED | OUTPATIENT
Start: 2021-12-23 | End: 2021-12-23 | Stop reason: HOSPADM

## 2021-12-23 RX ORDER — OLANZAPINE 5 MG/1
5 TABLET ORAL NIGHTLY
Qty: 30 TABLET | Refills: 0
Start: 2021-12-23 | End: 2022-01-22

## 2021-12-23 RX ORDER — DIVALPROEX SODIUM 500 MG/1
500 TABLET, DELAYED RELEASE ORAL 2 TIMES DAILY
Qty: 60 TABLET | Refills: 0 | Status: SHIPPED | OUTPATIENT
Start: 2021-12-23 | End: 2022-01-22

## 2021-12-23 RX ADMIN — DIVALPROEX SODIUM 500 MG: 500 TABLET, DELAYED RELEASE ORAL at 08:06

## 2021-12-23 ASSESSMENT — PAIN SCALES - GENERAL: PAINLEVEL_OUTOF10: 3

## 2021-12-23 NOTE — CARE COORDINATION
DIPAK faxed referral to [phone]. DIPAK called and spoke with [facility] to determine if the pt can be reviewed due to the COVID status. [facility] stated to send the referral and they will review it, if pt is not able to be accepted SW will work with pt on DC plan.

## 2021-12-23 NOTE — DISCHARGE INSTR - COC
Continuity of Care Form    Patient Name: Leonides Valdez   :  1980  MRN:  [id number]    Admit date:  2021  Discharge date:  ***    Code Status Order: Full Code   Advance Directives:      Admitting Physician:  Ora Dancer, MD  PCP: No primary care provider on file. Discharging Nurse: [facility] Unit/Room#: [id number]  Discharging Unit Phone Number: ***    Emergency Contact:   Extended Emergency Contact Information  Primary Emergency Contact: Marylou Miles  Address: [address], [address] [place] [address] Phone: [phone]  Relation: Grandparent    Past Surgical History:  History reviewed. No pertinent surgical history. Immunization History: There is no immunization history on file for this patient.     Active Problems:  Patient Active Problem List   Diagnosis Code    Acute exacerbation of chronic schizophrenia ([facility] [address] F20.9       Isolation/Infection:   Isolation            No Isolation          Patient Infection Status       Infection Onset Added Last Indicated Last Indicated By Review Planned Expiration Resolved Resolved By    COVID-19 21 COVID-19 & Influenza Combo 21      Resolved    COVID-19 (Rule Out) 21 COVID-19 & Influenza Combo (Ordered)   21 Rule-Out Test Resulted            Nurse Assessment:  Last Vital Signs: /77   Pulse 83 Comment: 83  Temp 97.9 °F (36.6 °C) (Oral)   Resp 16   Ht 5' 8\" (1.727 m)   Wt 190 lb (86.2 kg)   SpO2 100%   BMI 28.89 kg/m²     Last documented pain score (0-10 scale): Pain Level: 3  Last Weight:   Wt Readings from Last 1 Encounters:   21 190 lb (86.2 kg)     Mental Status:  {IP PT MENTAL STATUS:}    IV Access:  { PUSHPA IV ACCESS:390485982}    Nursing Mobility/ADLs:  Walking   {CHP DME KLEF:002412720}  Transfer  {CHP DME BPKS:073537615}  Bathing  {CHP DME YOHS:030222836}  Dressing  {CHP DME EJOJ:462007016}  Toileting  {CHP DME VKUR:390111129}  Feeding  {CHP DME WLJR:174837603}  Med Admin  {CHP DME BXKQ:708102449}  Med Delivery   { PUSHPA MED Delivery:798046871}    Wound Care Documentation and Therapy:        Elimination:  Continence: Bowel: {YES / BV:34702}  Bladder: {YES / CELESTIN:15878}  Urinary Catheter: {Urinary Catheter:869820145}   Colostomy/Ileostomy/Ileal Conduit: {YES / L}       Date of Last BM: ***  No intake or output data in the 24 hours ending 21 1422  No intake/output data recorded.     Safety Concerns:     [address] Safety Concerns:203520261}    Impairments/Disabilities:      [address] Impairments/Disabilities:229382456}    Nutrition Therapy:  Current Nutrition Therapy:   [address] Diet List:760990814}    Routes of Feeding: {[facility] DME Other Feedings:043564233}  Liquids: {Slp liquid thickness:39717}  Daily Fluid Restriction: {CHP DME Yes amt example:359695631}  Last Modified Barium Swallow with Video (Video Swallowing Test): {Done Not Done [facility]:142900806}    Treatments at the Time of Hospital Discharge:   Respiratory Treatments: ***  Oxygen Therapy:  {Therapy; copd oxygen:67307}  Ventilator:    { CC Vent ZLMA:701617361}    Rehab Therapies: {THERAPEUTIC INTERVENTION:4569241062}  Weight Bearing Status/Restrictions: [employer] Weight Bearin}  Other Medical Equipment (for information only, NOT a DME order):  {EQUIPMENT:326136285}  Other Treatments: ***    Patient's personal belongings (please select all that are sent with patient):  {[facility] DME Belongings:431853383}    RN SIGNATURE:  {Esignature:969121599}    CASE MANAGEMENT/SOCIAL WORK SECTION    Inpatient Status Date: ***    Readmission Risk Assessment Score:  Readmission Risk              Risk of Unplanned Readmission:  7           Discharging to Facility/ Agency   Name:   Address:  Phone:  Fax:    Dialysis Facility (if applicable)   Name:  Address:  Dialysis Schedule:  Phone:  Fax:    / signature: {Esignature:705167101}    PHYSICIAN SECTION    Prognosis: {Prognosis:8108747840}    Condition at Discharge: [address] Patient Condition:156161477}    Rehab Potential (if transferring to Rehab): {Prognosis:7101570159}    Recommended Labs or Other Treatments After Discharge: ***    Physician Certification: I certify the above information and transfer of Kaylene Robles  is necessary for the continuing treatment of the diagnosis listed and that he requires {Admit to Appropriate Level of Care:92511} for {GREATER/LESS:738873382} 30 days.      Update Admission H&P: {CHP DME Changes in KLRWF:312880799}    PHYSICIAN SIGNATURE:  {Esignature:085206585}

## 2021-12-23 NOTE — CARE COORDINATION
Sw called the [facility] in [place] and was informed that they don't currently have placement for males, but they can put him on a list to help him with housing. SW transferred to pt to speak with him about housing.

## 2021-12-23 NOTE — CARE COORDINATION
DIPAK spoke with pt about DC plan. Pt stated that [facility] and [facility]. SW informed RN and NP that pt is agreeable to go to the mission. DIPAK spoke with [facility], who then spoke with the pt to get information and pt stated that they are willing to help him find housing within the next couple days. DIPAK informed SW supervisor.      In order to ensure appropriate transition and discharge planning is in place, the following documents have been transmitted to [facility], as the new outpatient provider:     The d/c diagnosis was transmitted to the next care provider   The reason for hospitalization was transmitted to the next care provider   The d/c medications (dosage and indication) were transmitted to the next care provider    The continuing care plan was transmitted to the next care provider

## 2021-12-23 NOTE — PROGRESS NOTES
CLINICAL PHARMACY NOTE: MEDS TO BEDS    Total # of Prescriptions Filled: 3   The following medications were delivered to the patient:  · Nicotine 21 mg patches  · Divalproex sodium 500 mg  · Naproxen 250 mg    Additional Documentation:

## 2021-12-23 NOTE — CARE COORDINATION
Sw spoke with pt about DC plan and informed pt that [facility] is not able to accept him. SW was informed by the [facility] that the pt must take a drug test and pt informed mission that he smoked weed before coming to the hospital. Pt informed mission he is unsure if his test would come back negative due to smoking 2-3 weeks ago. SW waiting to hear back from mission due to pt being on a restriction list years ago and further approval is needed from director.

## 2021-12-23 NOTE — CARE COORDINATION
DIPAK called jer Montgomery [phone] and LVM to speak with her about booking a motel room for the pt.

## 2021-12-23 NOTE — PROGRESS NOTES
Patient observed in bed, sleeping, no distress noted. Patient calm during assessment but dismissive and evasive. Patient denied SI/HI/AVH and depression/anxiety, stating, \"I'm cool, I'm straight. \" Patient refused HS medication and stated to this RN, \"I told that lady I'm not taking that zyprexa and I don't do depakote at night, just the morning. Oh and tell bitch amanda I do not like her. \" Patient continued to refuse after education provide about importance of medication compliance. Will continue to monitor and provide support.

## 2021-12-23 NOTE — DISCHARGE INSTR - DIET

## 2021-12-23 NOTE — CARE COORDINATION
SW spoke with pt's grandma that stated he is not able to stay at the home due to her having no room and there is nowhere for him to stay that she knows of.

## 2021-12-23 NOTE — PROGRESS NOTES
Attempted to meet with patient face to face but patient was using the bathroom. Left word finds and educational worksheets in room for patient to complete. 11:26 Checked in with patient to see if he had any questions about resources provided. Declines need for any recreational therapy services. Observed laying in bed listening to music on TV.

## 2021-12-23 NOTE — PROGRESS NOTES
Met with patient face to face. Pt stated he did not want any resources from Recreational therapy because he \"is suppose to leave today and did not want extra stuff to have to carry. \"  Encouraged patient to use his social supports whenever he could. Patient agreed. 2:55 Met with patient face to face. Pt stated \"the SW and RN are discharging me. .I am going to the [facility] [place]. \" Offered encouragement and support to patient . RN stated she was going in to discharge patient.

## 2021-12-23 NOTE — PLAN OF CARE
Problem: Airway Clearance - Ineffective  Goal: Achieve or maintain patent airway  Outcome: Completed     Problem: Gas Exchange - Impaired  Goal: Absence of hypoxia  Outcome: Completed  Goal: Promote optimal lung function  Outcome: Completed     Problem: Breathing Pattern - Ineffective  Goal: Ability to achieve and maintain a regular respiratory rate  Outcome: Completed     Problem:  Body Temperature -  Risk of, Imbalanced  Goal: Ability to maintain a body temperature within defined limits  Outcome: Completed  Goal: Will regain or maintain usual level of consciousness  Outcome: Completed  Goal: Complications related to the disease process, condition or treatment will be avoided or minimized  Outcome: Completed     Problem: Isolation Precautions - Risk of Spread of Infection  Goal: Prevent transmission of infection  Outcome: Completed     Problem: Nutrition Deficits  Goal: Optimize nutritional status  Outcome: Completed     Problem: Risk for Fluid Volume Deficit  Goal: Maintain normal heart rhythm  Outcome: Completed  Goal: Maintain absence of muscle cramping  Outcome: Completed  Goal: Maintain normal serum potassium, sodium, calcium, phosphorus, and pH  Outcome: Completed     Problem: Loneliness or Risk for Loneliness  Goal: Demonstrate positive use of time alone when socialization is not possible  Outcome: Completed     Problem: Fatigue  Goal: Verbalize increase energy and improved vitality  Outcome: Completed     Problem: Patient Education: Go to Patient Education Activity  Goal: Patient/Family Education  Outcome: Completed     Problem: Pain:  Goal: Pain level will decrease  Description: Pain level will decrease  Outcome: Completed  Goal: Control of acute pain  Description: Control of acute pain  Outcome: Completed  Goal: Control of chronic pain  Description: Control of chronic pain  Outcome: Completed     Problem: Altered Mood, Depressive Behavior:  Goal: Able to verbalize acceptance of life and situations over which he or she has no control  Description: Able to verbalize acceptance of life and situations over which he or she has no control  Outcome: Completed  Goal: Able to verbalize and/or display a decrease in depressive symptoms  Description: Able to verbalize and/or display a decrease in depressive symptoms  Outcome: Completed  Goal: Ability to disclose and discuss suicidal ideas will improve  Description: Ability to disclose and discuss suicidal ideas will improve  Outcome: Completed  Goal: Able to verbalize support systems  Description: Able to verbalize support systems  Outcome: Completed  Goal: Absence of self-harm  Description: Absence of self-harm  Outcome: Completed  Goal: Patient specific goal  Description: Patient specific goal  Outcome: Completed  Goal: Participates in care planning  Description: Participates in care planning  Outcome: Completed

## 2021-12-23 NOTE — CARE COORDINATION
Sw went and spoke with pt face to face with NP India Garcia. Pt stated that he went to [facility] to get [facility] and was sent to the hospital for medical clearance. Pt was informed that he will be DC today and he needs to have a place to go. Pt stated that he does not want to [facility] to the [facility] because he wanted to go to [facility] from the beginning. Sw informed pt that SW will update him on his DC plan and if [facility] is able to accept him and if they are not able to accept him, a new plan will need to be determined on where he will be staying at DC. Pt denied SI, HI, AVH.

## 2021-12-23 NOTE — PROGRESS NOTES
hydroxide-simethicone (MAALOX) 200-200-20 MG/5ML suspension 30 mL, 30 mL, Oral, PRN, Ivin MD Marifer    hydrOXYzine (VISTARIL) capsule 50 mg, 50 mg, Oral, TID PRN, Ivin MD Marifer    haloperidol (HALDOL) tablet 5 mg, 5 mg, Oral, Q6H PRN **OR** haloperidol lactate (HALDOL) injection 5 mg, 5 mg, IntraMUSCular, Q6H PRN, Ivin MD Marifer    traZODone (DESYREL) tablet 50 mg, 50 mg, Oral, Nightly PRN, Ivin Marifer, MD    diphenhydrAMINE (BENADRYL) injection 50 mg, 50 mg, IntraMUSCular, Q6H PRN, Ivin MariferMD oskar    diphenhydrAMINE (BENADRYL) tablet 25 mg, 25 mg, Oral, Q6H PRN, Ivin Marifer, MD    LORazepam (ATIVAN) injection 2 mg, 2 mg, IntraMUSCular, Q6H PRN, Ivin Marifer, MD    LORazepam (ATIVAN) tablet 2 mg, 2 mg, Oral, Q6H PRN, Ivin MD Marifer      Examination:  /77   Pulse 83 Comment: 83  Temp 97.9 °F (36.6 °C) (Oral)   Resp 16   Ht 5' 8\" (1.727 m)   Wt 190 lb (86.2 kg)   SpO2 100%   BMI 28.89 kg/m²   Gait - steady  Medication side effects(SE): none reported    Mental Status Examination:    Level of consciousness:  within normal limits   Appearance:  fair grooming and fair hygiene  Behavior/Motor:  no abnormalities noted  Attitude toward examiner:  cooperative  Speech:  normal rate and normal volume   Mood: euthymic  Affect:  mood congruent  Thought processes:  linear and goal directed   Thought content: The patient is devoid of suicidal or homicidal ideation intent or plan. Devoid of auditory or visual hallucinations or other perceptual disturbances, there are no overt or covert signs of psychosis or paranoia. There are no neurovegetative signs of depression.   Cognition:  oriented to person, place, and time   Concentration intact  Insight fair   Judgement fair     ASSESSMENT:   Patient symptoms are:  [x] Well controlled  [] Improving  [] Worsening  [] No change      Diagnosis:   Principal Problem:    Acute exacerbation of chronic [facility] ([facility]  Resolved Problems:    * No resolved hospital problems. *      LABS:    Recent Labs     12/21/21 0446   WBC 4.4*   HGB 11.9*        Recent Labs     12/21/21 0446      K 3.8      CO2 24   BUN 21*   CREATININE 0.9   GLUCOSE 82     Recent Labs     12/21/21 0446   BILITOT 0.6   ALKPHOS 109   AST 18   ALT 15     Lab Results   Component Value Date    LABAMPH NOT DETECTED 12/21/2021    BARBSCNU NOT DETECTED 12/21/2021    LABBENZ NOT DETECTED 12/21/2021    LABMETH NOT DETECTED 12/21/2021    OPIATESCREENURINE NOT DETECTED 12/21/2021    PHENCYCLIDINESCREENURINE NOT DETECTED 12/21/2021    ETOH <10 12/21/2021     Lab Results   Component Value Date    TSH 2.520 03/18/2017     No results found for: LITHIUM  No results found for: VALPROATE, CBMZ      Treatment Plan:  The patient's diagnosis, treatment plan, medication management were formulated after patient was seen directly by the attending physician and myself and all relevant documentation was reviewed. Risk, benefit, side effects, possible outcomes of the medication and alternatives discussed with the patient and the patient demonstrated understanding. The patient was also educated that the outcome of treatment will depend on the medication compliance as directed by the prescribers along with regular follow-up, compliance with the labs and other work-up, as clinically indicated. The patient was referred to outpatient/inpatient substance abuse rehabilitation programming. He was educated multiple times during the hospitalization that if he chooses to continue to use drugs or alcohol, he may potentially act out impulsively, resulting in serious harm to self or others, even though unintentional.  He was also educated that mental health treatment cannot be optimized with ongoing use of drugs. He demonstrated understanding has the capacity to understand that.       Collateral information: followed by social work  CD evaluation  Encourage patient to attend group and other milieu activities. Discharge planning discussed with the patient and treatment team.    PSYCHOTHERAPY/COUNSELING:  [x] Therapeutic interview  [x] Supportive  [] CBT  [] Ongoing  [] Other    [x] Patient continues to need, on a daily basis, active treatment furnished directly by or requiring the supervision of inpatient psychiatric personnel      Anticipated Length of stay: 5 - 7 days based on stability       NOTE: This report was transcribed using voice recognition software. Every effort was made to ensure accuracy; however, inadvertent computerized transcription errors may be present.     Electronically signed by OSWALD Suazo CNP on 12/23/2021 at 11:00 AM

## 2021-12-23 NOTE — PLAN OF CARE
Problem: Altered Mood, Depressive Behavior:  Goal: Able to verbalize acceptance of life and situations over which he or she has no control  Description: Able to verbalize acceptance of life and situations over which he or she has no control  12/22/2021 2222 by Haydee Rosales RN  Outcome: Met This Shift     Problem: Altered Mood, Depressive Behavior:  Goal: Able to verbalize and/or display a decrease in depressive symptoms  Description: Able to verbalize and/or display a decrease in depressive symptoms  Outcome: Ongoing     Problem: Altered Mood, Depressive Behavior:  Goal: Ability to disclose and discuss suicidal ideas will improve  Description: Ability to disclose and discuss suicidal ideas will improve  Outcome: Ongoing

## 2021-12-23 NOTE — CARE COORDINATION
DIPAK spoke with Kathleen Landon from [facility]. He states they are unable to accept patient due to his COVID + status and that he is still in the 10 day isolation window. Kathleen Landon states they are able to accommodate such circumstances sometimes but are not able to meet the need this time.

## 2021-12-23 NOTE — CARE COORDINATION
Cortney gave verbal permission for CORTNEY to call his grandma Raimundo Brenner [phone], CORTNEY called to discuss DC placement and LVM.

## 2021-12-24 NOTE — DISCHARGE SUMMARY
DISCHARGE SUMMARY      Patient ID:  Zina Ulrich  [id number]  46 y.o.  1980    Admit date: 12/21/2021    Discharge date and time: 12/24/2021    Admitting Physician: Makayla Hartman MD     Discharge Physician: Dr Julius Pelaez MD    Discharge Diagnoses:   Patient Active Problem List   Diagnosis    Acute exacerbation of chronic schizophrenia [facility])       Admission Condition: poor    Discharged Condition: stable    Admission Circumstance:   Patient presented to [facility] ED interested in admission to the [facility] and to get back on his medications. Patient is reporting auditory hallucinations, he is preoccupied, and tangential, paranoid. Patient is pink slipped for psychosis. PAST MEDICAL/PSYCHIATRIC HISTORY:   Past Medical History:   Diagnosis Date    Schizophrenia [facility])        FAMILY/SOCIAL HISTORY:  History reviewed. No pertinent family history. Social History     Socioeconomic History    Marital status: Single     Spouse name: Not on file    Number of children: Not on file    Years of education: Not on file    Highest education level: Not on file   Occupational History    Not on file   Tobacco Use    Smoking status: Current Every Day Smoker     Packs/day: 0.10     Types: Cigars    Smokeless tobacco: Not on file    Tobacco comment: pt stats he does black and milds   Substance and Sexual Activity    Alcohol use: No    Drug use: No    Sexual activity: Not Currently   Other Topics Concern    Not on file   Social History Narrative    Not on file     Social Determinants of Health     Financial Resource Strain:     Difficulty of Paying Living Expenses: Not on file   Food Insecurity:     Worried About Running Out of Food in the Last Year: Not on file    Katia of Food in the Last Year: Not on file   Transportation Needs:     Lack of Transportation (Medical): Not on file    Lack of Transportation (Non-Medical):  Not on file   Physical Activity:     Days of Exercise per Week: Not on file    Minutes of Exercise per Session: Not on file   Stress:     Feeling of Stress : Not on file   Social Connections:     Frequency of Communication with Friends and Family: Not on file    Frequency of Social Gatherings with Friends and Family: Not on file    Attends Temple Services: Not on file    Active Member of Clubs or Organizations: Not on file    Attends Club or Organization Meetings: Not on file    Marital Status: Not on file   Intimate Partner Violence:     Fear of Current or Ex-Partner: Not on file    Emotionally Abused: Not on file    Physically Abused: Not on file    Sexually Abused: Not on file   Housing Stability:     Unable to Pay for Housing in the Last Year: Not on file    Number of [place] in the Last Year: Not on file    Unstable Housing in the Last Year: Not on file       MEDICATIONS:  No current facility-administered medications for this encounter. Current Outpatient Medications:     naproxen (NAPROSYN) 250 MG tablet, Take 1 tablet by mouth 2 times daily (with meals) for 5 days, Disp: 10 tablet, Rfl: 0    divalproex (DEPAKOTE) 500 MG DR tablet, Take 1 tablet by mouth 2 times daily, Disp: 60 tablet, Rfl: 0    OLANZapine (ZYPREXA) 5 MG tablet, Take 1 tablet by mouth nightly, Disp: 30 tablet, Rfl: 0    nicotine (NICODERM CQ) 21 MG/24HR, Place 1 patch onto the skin daily, Disp: 30 patch, Rfl: 3    Examination:  /77   Pulse 83 Comment: 83  Temp 97.9 °F (36.6 °C) (Oral)   Resp 16   Ht 5' 8\" (1.727 m)   Wt 190 lb (86.2 kg)   SpO2 100%   BMI 28.89 kg/m²   Gait - steady    HOSPITAL COURSE[de-identified]  Following admission to the hospital, patient had a complete physical exam and blood work up, which he was medically cleared and admitted to [facility] for psychiatric evaluation and stabilization. The patient was monitored closely with suicide and appropriate precautions. He was started on Depakote 500 mg twice daily for mood stabilization and Zyprexa 5 mg nightly for augmentation of treatment. He was encouraged to participate in group and other milieu activity and started to feel better with this combination of treatment. There has been significant progress in the improvement of symptoms since admission. The patient has been an active participant in his treatment, and discharge planning. Patient was no longer suicidal, homicidal, manic or psychotic. He received the required treatment with medication, participated in group milieu, remained engaged in unit activities, learned appropriate coping skills. He was seen to be watching television socializing with peers using the phone. There were no mention or gestures of self-harm or harm to others. His mental status has returned to baseline. The treatment team believes the patient obtain maximum benefit out of this hospitalization and does not meet the criteria for inpatient hospitalization anymore. However he will continue to benefit from outpatient follow-up and treatment to maintain stability. Collateral information has been obtained and reconciled and there are no concerns about his safety. He has no access to guns or weapons. He appreciates the help that he received here. This patient no longer meets criteria for inpatient hospitalization. He was discharged home to his family in psychiatrically stable condition. Appetite:  [x] Normal  [] Increased  [] Decreased    Sleep:       [x] Normal  [] Fair       [] Poor            Energy:    [x] Normal  [] Increased  [] Decreased     SI [] Present  [x] Absent  HI  []Present  [x] Absent   Aggression:  [] yes  [] no  Patient is [x] able  [] unable to CONTRACT FOR SAFETY   Medication side effects(SE):  [x] None(Psych.  Meds.) [] Other      Mental Status Examination on discharge:    Level of consciousness:  within normal limits   Appearance:  well-appearing  Behavior/Motor:  no abnormalities noted  Attitude toward examiner:  attentive and good eye contact  Speech:  spontaneous, normal rate and normal volume   Mood: euthymic  Affect:  mood congruent  Thought processes:  linear and goal directed   Thought content: The patient is devoid of suicidal or homicidal ideation intent or plan. Devoid of auditory or visual hallucinations or other perceptual disturbances, there are no overt or covert signs of psychosis or paranoia. There are no neurovegetative signs of depression. Cognition:  oriented to person, place, and time   Concentration intact  Memory intact  Insight good   Judgement fair   Fund of Knowledge adequate      ASSESSMENT:  Patient symptoms are:  [x] Well controlled  [x] Improving  [] Worsening  [] No change    Reason for more than one antipsychotic:  [x] N/A  [] 3 Failed Monotherapy attempts (Drugs tried:)  [] Crossover to a new antipsychotic  [] Taper to Monotherapy from Polypharmacy  [] Augmentation of clozapine therapy due to treatment resistance to single therapy    Diagnosis:  Principal Problem:    Acute exacerbation of chronic [facility] ([facility]  Resolved Problems:    * No resolved hospital problems. *      LABS:    No results for input(s): WBC, HGB, PLT in the last 72 hours. No results for input(s): NA, K, CL, CO2, BUN, CREATININE, GLUCOSE in the last 72 hours. No results for input(s): BILITOT, ALKPHOS, AST, ALT in the last 72 hours. Lab Results   Component Value Date    LABAMPH NOT DETECTED 12/23/2021    BARBSCNU NOT DETECTED 12/23/2021    LABBENZ NOT DETECTED 12/23/2021    LABMETH NOT DETECTED 12/23/2021    OPIATESCREENURINE NOT DETECTED 12/23/2021    PHENCYCLIDINESCREENURINE NOT DETECTED 12/23/2021    ETOH <10 12/21/2021     Lab Results   Component Value Date    TSH 2.520 03/18/2017     No results found for: LITHIUM  No results found for: VALPROATE, CBMZ    RISK ASSESSMENT AT DISCHARGE: Low risk for suicide and homicide.      Treatment Plan:  The patient's diagnosis, treatment plan, medication management were formulated after patient was seen directly by the attending physician and myself and all relevant documentation was reviewed. The patient was referred to outpatient/inpatient substance abuse rehabilitation programming. He was educated multiple times during the hospitalization that if he chooses to continue to use drugs or alcohol, he may potentially act out impulsively, resulting in serious harm to self or others, even though unintentional.  He was also educated that mental health treatment cannot be optimized with ongoing use of drugs. He demonstrated understanding has the capacity to understand that. Risk, benefit, side effects, possible outcomes of the medication and alternatives discussed with the patient and the patient demonstrated understanding. The patient was also educated that the outcome of treatment will depend on the medication compliance as directed by the prescribers along with regular follow-up, compliance with the labs and other work-up, as clinically indicated. Encourage patient to attend outpatient follow up appointment and therapy, outpatient follow-up appointments and and scheduled prior to discharge. Patient was advised to call the outpatient provider, visit the nearest ED or call 911 if symptoms are not manageable. Patient's family member was contacted prior to the discharge, patient has been [facility] the [facility] in psychiatrically stable condition. Medication List      START taking these medications    divalproex 500 MG DR tablet  Commonly known as: DEPAKOTE  Take 1 tablet by mouth 2 times daily     naproxen 250 MG tablet  Commonly known as: NAPROSYN  Take 1 tablet by mouth 2 times daily (with meals) for 5 days     nicotine 21 MG/24HR  Commonly known as: NICODERM CQ  Place 1 patch onto the skin daily     OLANZapine 5 MG tablet  Commonly known as: ZYPREXA  Take 1 tablet by mouth nightly           Where to Get Your Medications      These medications were sent to [facility] [address] [facility].  - [address] [facility] [address] [place] [address]    Phone: [phone]   · divalproex 500 MG DR tablet  · naproxen 250 MG tablet  · nicotine 21 MG/24HR     Information about where to get these medications is not yet available    Ask your nurse or doctor about these medications  · OLANZapine 5 MG tablet     NOTE: This report was transcribed using voice recognition software. Every effort was made to ensure accuracy; however, inadvertent computerized transcription errors may be present.       TIME SPEND - 35 MINUTES TO COMPLETE THE EVALUATION, DISCHARGE SUMMARY, MEDICATION RECONCILIATION AND FOLLOW UP CARE     Signed:  Chanetta Heimlich, APRN - [place]  12/24/2021  11:55 AM

## 2022-02-11 NOTE — PROGRESS NOTES
Met with patient face to face to complete assessment. Denies need for recreational therapy resources at this time. Focused on getting housing. none

## 2023-04-17 LAB
ALBUMIN SERPL-MCNC: 4.1 G/DL (ref 3.5–5.2)
ALP SERPL-CCNC: 115 U/L (ref 40–129)
ALT SERPL-CCNC: 14 U/L (ref 0–40)
AMPHET UR QL SCN: NOT DETECTED
ANION GAP SERPL CALCULATED.3IONS-SCNC: 10 MMOL/L (ref 7–16)
APAP SERPL-MCNC: <5 MCG/ML (ref 10–30)
AST SERPL-CCNC: 15 U/L (ref 0–39)
BACTERIA URNS QL MICRO: NORMAL /HPF
BARBITURATES UR QL SCN: NOT DETECTED
BASOPHILS # BLD: 0.02 E9/L (ref 0–0.2)
BASOPHILS NFR BLD: 0.4 % (ref 0–2)
BENZODIAZ UR QL SCN: NOT DETECTED
BILIRUB SERPL-MCNC: 0.4 MG/DL (ref 0–1.2)
BILIRUB UR QL STRIP: NEGATIVE
BUN SERPL-MCNC: 13 MG/DL (ref 6–20)
CALCIUM SERPL-MCNC: 9.5 MG/DL (ref 8.6–10.2)
CANNABINOIDS UR QL SCN: NOT DETECTED
CHLORIDE SERPL-SCNC: 101 MMOL/L (ref 98–107)
CLARITY UR: CLEAR
CO2 SERPL-SCNC: 26 MMOL/L (ref 22–29)
COCAINE UR QL SCN: NOT DETECTED
COLOR UR: YELLOW
CREAT SERPL-MCNC: 1 MG/DL (ref 0.7–1.2)
DRUG SCREEN COMMENT UR-IMP: NORMAL
EOSINOPHIL # BLD: 0.04 E9/L (ref 0.05–0.5)
EOSINOPHIL NFR BLD: 0.8 % (ref 0–6)
ERYTHROCYTE [DISTWIDTH] IN BLOOD BY AUTOMATED COUNT: 12.9 FL (ref 11.5–15)
ETHANOLAMINE SERPL-MCNC: <10 MG/DL (ref 0–0.08)
FENTANYL SCREEN, URINE: NOT DETECTED
FLUAV RNA RESP QL NAA+PROBE: NOT DETECTED
FLUBV RNA RESP QL NAA+PROBE: NOT DETECTED
GLUCOSE SERPL-MCNC: 90 MG/DL (ref 74–99)
GLUCOSE UR STRIP-MCNC: NEGATIVE MG/DL
HCT VFR BLD AUTO: 45.3 % (ref 37–54)
HGB BLD-MCNC: 13.7 G/DL (ref 12.5–16.5)
HGB UR QL STRIP: NEGATIVE
IMM GRANULOCYTES # BLD: 0.02 E9/L
IMM GRANULOCYTES NFR BLD: 0.4 % (ref 0–5)
KETONES UR STRIP-MCNC: NEGATIVE MG/DL
LEUKOCYTE ESTERASE UR QL STRIP: NEGATIVE
LYMPHOCYTES # BLD: 1.62 E9/L (ref 1.5–4)
LYMPHOCYTES NFR BLD: 31 % (ref 20–42)
MCH RBC QN AUTO: 23.4 PG (ref 26–35)
MCHC RBC AUTO-ENTMCNC: 30.2 % (ref 32–34.5)
MCV RBC AUTO: 77.4 FL (ref 80–99.9)
METHADONE UR QL SCN: NOT DETECTED
MONOCYTES # BLD: 0.5 E9/L (ref 0.1–0.95)
MONOCYTES NFR BLD: 9.6 % (ref 2–12)
NEUTROPHILS # BLD: 3.02 E9/L (ref 1.8–7.3)
NEUTS SEG NFR BLD: 57.8 % (ref 43–80)
NITRITE UR QL STRIP: NEGATIVE
OPIATES UR QL SCN: NOT DETECTED
OXYCODONE URINE: NOT DETECTED
PCP UR QL SCN: NOT DETECTED
PH UR STRIP: 6 [PH] (ref 5–9)
PLATELET # BLD AUTO: 263 E9/L (ref 130–450)
PMV BLD AUTO: 9.7 FL (ref 7–12)
POTASSIUM SERPL-SCNC: 4.2 MMOL/L (ref 3.5–5)
PROT SERPL-MCNC: 8 G/DL (ref 6.4–8.3)
PROT UR STRIP-MCNC: NEGATIVE MG/DL
RBC # BLD AUTO: 5.85 E12/L (ref 3.8–5.8)
RBC #/AREA URNS HPF: NORMAL /HPF (ref 0–2)
SALICYLATES SERPL-MCNC: <0.3 MG/DL (ref 0–30)
SARS-COV-2 RNA RESP QL NAA+PROBE: NOT DETECTED
SODIUM SERPL-SCNC: 137 MMOL/L (ref 132–146)
SP GR UR STRIP: 1.02 (ref 1–1.03)
TRICYCLIC ANTIDEPRESSANTS SCREEN SERUM: NEGATIVE NG/ML
UROBILINOGEN UR STRIP-ACNC: 0.2 E.U./DL
WBC # BLD: 5.2 E9/L (ref 4.5–11.5)
WBC #/AREA URNS HPF: NORMAL /HPF (ref 0–5)

## 2023-04-17 PROCEDURE — 80143 DRUG ASSAY ACETAMINOPHEN: CPT

## 2023-04-17 PROCEDURE — 82077 ASSAY SPEC XCP UR&BREATH IA: CPT

## 2023-04-17 PROCEDURE — 80053 COMPREHEN METABOLIC PANEL: CPT

## 2023-04-17 PROCEDURE — 93005 ELECTROCARDIOGRAM TRACING: CPT | Performed by: PHYSICIAN ASSISTANT

## 2023-04-17 PROCEDURE — 80307 DRUG TEST PRSMV CHEM ANLYZR: CPT

## 2023-04-17 PROCEDURE — 85025 COMPLETE CBC W/AUTO DIFF WBC: CPT

## 2023-04-17 PROCEDURE — 80179 DRUG ASSAY SALICYLATE: CPT

## 2023-04-17 PROCEDURE — 87636 SARSCOV2 & INF A&B AMP PRB: CPT

## 2023-04-17 PROCEDURE — 99284 EMERGENCY DEPT VISIT MOD MDM: CPT

## 2023-04-17 PROCEDURE — 81001 URINALYSIS AUTO W/SCOPE: CPT

## 2023-04-17 NOTE — ED NOTES
FIRST PROVIDER CONTACT ASSESSMENT NOTE       Department of Emergency Medicine                 First Provider Note            23  4:33 PM EDT    Date of Encounter: No admission date for patient encounter. Patient Name: Alisson Lunsford  : 1980  MRN: [id number]    Chief Complaint: Homeless (Needs help getting into crisis center. -SI -HI -hallucinations) and Back Pain (Upper back pain that hurts all the time)      History of Present Illness:   Alisson Lunsford is a 38 y.o. male who presents to the ED for needing help to get to the crisis unit. Denies suicidal or homicidal ideations. Focused Physical Exam:  VS:    ED Triage Vitals   BP Temp Temp Source Heart Rate Resp SpO2 Height Weight   23 1627 23 1551 23 1551 23 1551 23 1627 23 1551 -- --   (!) 138/99 97.2 °F (36.2 °C) Temporal (!) 106 16 99 %          Physical Ex: Constitutional: Alert and non-toxic. Medical History:  has a past medical history of Schizophrenia ([facility] Surgical History:  has no past surgical history on file. Social History:  reports that he has been smoking cigars. He has been smoking an average of .1 packs per day. He does not have any smokeless tobacco history on file. He reports that he does not drink alcohol and does not use drugs. Family History: family history is not on file. Allergies: Patient has no known allergies.      Initial Plan of Care: Initiate Treatment-Testing, Proceed toTreatment Area When Bed Available for ED Attending/MLP to Continue Care      ---END OF FIRST PROVIDER CONTACT ASSESSMENT NOTE---  Electronically signed by Denver Guile, PA   DD: 23       Denver Guile, PA  23 [id number]

## 2023-04-18 ENCOUNTER — HOSPITAL ENCOUNTER (EMERGENCY)
Age: 43
Discharge: HOME | End: 2023-04-18
Attending: STUDENT IN AN ORGANIZED HEALTH CARE EDUCATION/TRAINING PROGRAM

## 2023-04-18 ENCOUNTER — HOSPITAL ENCOUNTER (EMERGENCY)
Age: 43
Discharge: LEFT BEFORE DISCHARGE | End: 2023-04-18
Payer: COMMERCIAL

## 2023-04-18 VITALS
HEART RATE: 99 BPM | RESPIRATION RATE: 16 BRPM | SYSTOLIC BLOOD PRESSURE: 122 MMHG | OXYGEN SATURATION: 100 % | TEMPERATURE: 97.6 F | DIASTOLIC BLOOD PRESSURE: 82 MMHG

## 2023-04-18 VITALS
RESPIRATION RATE: 16 BRPM | HEART RATE: 93 BPM | OXYGEN SATURATION: 97 % | TEMPERATURE: 97.2 F | SYSTOLIC BLOOD PRESSURE: 137 MMHG | DIASTOLIC BLOOD PRESSURE: 80 MMHG

## 2023-04-18 DIAGNOSIS — Z76.89 ENCOUNTER FOR PSYCHIATRIC ASSESSMENT: Primary | ICD-10-CM

## 2023-04-18 LAB
EKG ATRIAL RATE: 82 BPM
EKG P AXIS: 80 DEGREES
EKG P-R INTERVAL: 162 MS
EKG Q-T INTERVAL: 368 MS
EKG QRS DURATION: 76 MS
EKG QTC CALCULATION (BAZETT): 429 MS
EKG R AXIS: 72 DEGREES
EKG T AXIS: 43 DEGREES
EKG VENTRICULAR RATE: 82 BPM

## 2023-04-18 PROCEDURE — 93010 ELECTROCARDIOGRAM REPORT: CPT | Performed by: INTERNAL MEDICINE

## 2023-04-18 PROCEDURE — 6370000000 HC RX 637 (ALT 250 FOR IP): Performed by: NURSE PRACTITIONER

## 2023-04-18 RX ORDER — IBUPROFEN 800 MG/1
800 TABLET ORAL ONCE
Status: COMPLETED | OUTPATIENT
Start: 2023-04-18 | End: 2023-04-18

## 2023-04-18 RX ORDER — IBUPROFEN 800 MG/1
800 TABLET ORAL ONCE
Status: DISCONTINUED | OUTPATIENT
Start: 2023-04-18 | End: 2023-04-18 | Stop reason: HOSPADM

## 2023-04-18 RX ADMIN — IBUPROFEN 800 MG: 800 TABLET, FILM COATED ORAL at 02:10

## 2023-04-18 ASSESSMENT — LIFESTYLE VARIABLES
HOW MANY STANDARD DRINKS CONTAINING ALCOHOL DO YOU HAVE ON A TYPICAL DAY: PATIENT DOES NOT DRINK
HOW OFTEN DO YOU HAVE A DRINK CONTAINING ALCOHOL: NEVER

## 2023-04-18 ASSESSMENT — PAIN SCALES - GENERAL: PAINLEVEL_OUTOF10: 6

## 2023-04-18 NOTE — ED PROVIDER NOTES
[address] [facility] ENCOUNTER        Pt Name: Claire Holstein  MRN: [id number]  Awildagfangela 1980  Date of evaluation: 4/18/2023  Provider: Janee Brown DO  PCP: No primary care provider on file. Note Started: 5:15 PM EDT 4/18/23    CHIEF COMPLAINT       No chief complaint on file. HISTORY OF PRESENT ILLNESS: 1 or more Elements   History From: ***    {Limitations to history (Optional):08680}    Salazar Senior is a 38 y.o. male who presents ***    Nursing Notes were all reviewed and agreed with or any disagreements were addressed in the HPI. REVIEW OF SYSTEMS :      Review of Systems    Positives and Pertinent negatives as per HPI. SURGICAL HISTORY   No past surgical history on file. CURRENTMEDICATIONS       Previous Medications    DIVALPROEX (DEPAKOTE) 500 MG DR TABLET    Take 1 tablet by mouth 2 times daily    NAPROXEN (NAPROSYN) 250 MG TABLET    Take 1 tablet by mouth 2 times daily (with meals) for 5 days    NICOTINE (NICODERM CQ) 21 MG/24HR    Place 1 patch onto the skin daily    OLANZAPINE (ZYPREXA) 5 MG TABLET    Take 1 tablet by mouth nightly       ALLERGIES     Patient has no known allergies. FAMILYHISTORY     No family history on file.      SOCIAL HISTORY       Social History     Tobacco Use    Smoking status: Every Day     Packs/day: 0.10     Types: Cigars, Cigarettes    Tobacco comments:     pt stats he does black and milds   Substance Use Topics    Alcohol use: No    Drug use: No       SCREENINGS                         CIWA Assessment  BP: (!) 168/103  Heart Rate: (!) 123           PHYSICAL EXAM  1 or more Elements     ED Triage Vitals [04/18/23 1706]   BP Temp Temp src Heart Rate Resp SpO2 Height Weight   (!) 168/103 97.6 °F (36.4 °C) -- (!) 123 16 100 % -- --       Physical Exam    ***    Constitutional/General: Alert and oriented x3  Head: Normocephalic and atraumatic  Eyes: PERRL, EOMI, conjunctiva

## 2023-04-18 NOTE — ED NOTES
Called pt three times for room w/no answer. Pt eloped before treatment complete.      Prabhu Nassar RN  04/18/23 9269

## 2023-04-19 NOTE — DISCHARGE INSTRUCTIONS
[facility]  Location: [address] [facility], [address]   Phone number: [phone]     [facility]  Location: [address] [facility], [address]  Phone: [phone]      Peer warm line [phone] you can call between the hours of 4pm to 8pm to speak with a

## 2023-04-20 NOTE — ED NOTES
looking for patient because he ordered a credit card.    Ex [id number]     [facility]  04/20/23 1929
Attempted to medicate patient, patient not in chairs.  Will attempted again      Ricarda Arreola RN  04/18/23 2016
Patient called again, not seen.  Patient marked as zach Waters RN  04/18/23 2040
Pt was denied at the Crisis Unit due to not meeting criteria. Pt was upset and asked for this SW to help him find housing. SW explained to pt that I can provide referrals for shelters and see if there are any open beds, but I am unable to search for housing for pt  in the ER. BILL QUESADA called [facility] who reports they do not have any open beds at this time. [facility] DIPAK called the [facility], no answer. Call to [facility]- reports pt is on an indefinite restriction there due to vandalism of their property and he is unable to go there. Pt given resources that may be able to help assist pt with his needs.       Steve Amaya  04/18/23 2014
Reports:    [] Past [] Present   [x] Denies    Hallucinations/Delusions:  [] Reports:   [x] Denies     Substance Use/Alcohol Use/Addiction:  [] Reports:   [x] Denies   [x] SBIRT Screen Complete. Current or Past Substance Abuse Treatment:  [] Yes, When and Where:  [x] No    Current or Past Mental Health Treatment:  [] Yes, When and Where:  [x] No    Legal Issues:  [x]  Yes (Specify) on probation for vandalism  []  No    Access to Weapons:  []  Yes (Specify)  [x]  No    Trauma History:  [] Reports:  [x] Denies     Living Situation: Homeless    Employment: n/a    Education Level: GED    Violence Risk Screening:        Have you ever thought about hurting someone? [x]  No  []  Yes (Ask the questions listed below)   When? Did you follow through with the thoughts? [x] No     [] Yes- When and what happened? 2.  Have you ever threatened anyone? [x]  No  []  Yes (Ask the questions listed below)   When and what happened? Have you ever threatened someone with a gun, knife or other weapon? [x]  No  []  Yes - When and what happened? 2. Have you ever had an order of protection taken out against you? []  Yes [x]  No  3. Have you ever been arrested due to violence? []  Yes [x]  No  4. Have you ever been cruel to animals? []  Yes [x]  No    After consideration of C-SSRS screening results, C-SSRS assessments, and this professional's assessment the patient's overall suicide risk assessed to be:  [x] No Risk  [] Low   [] Moderate   [] High     [x] Discussed current suicide risk, protective and risk factors with RN and ED Physician     Disposition:  [] Home:   [] Outpatient Provider:   [x] Crisis Unit:   [] Inpatient Psychiatric Unit:  [] Other:     Pt requesting to go to CSU.  Pt does not meet criteria for inpatient psych at this time               Tia Farooq [facility]  04/18/23 1812

## 2024-01-16 ENCOUNTER — HOSPITAL ENCOUNTER (EMERGENCY)
Age: 44
Discharge: HOME | End: 2024-01-16
Payer: COMMERCIAL

## 2024-01-16 VITALS
OXYGEN SATURATION: 98 % | TEMPERATURE: 98 F | RESPIRATION RATE: 18 BRPM | SYSTOLIC BLOOD PRESSURE: 136 MMHG | DIASTOLIC BLOOD PRESSURE: 74 MMHG | HEART RATE: 94 BPM

## 2024-01-16 DIAGNOSIS — Z59.00 HOMELESS: Primary | ICD-10-CM

## 2024-01-16 LAB
ALBUMIN SERPL-MCNC: 4.4 G/DL (ref 3.5–5.2)
ALP SERPL-CCNC: 120 U/L (ref 40–129)
ALT SERPL-CCNC: 12 U/L (ref 0–40)
AMPHET UR QL SCN: NEGATIVE
ANION GAP SERPL CALCULATED.3IONS-SCNC: 11 MMOL/L (ref 7–16)
APAP SERPL-MCNC: <5 UG/ML (ref 10–30)
AST SERPL-CCNC: 17 U/L (ref 0–39)
BARBITURATES UR QL SCN: NEGATIVE
BASOPHILS # BLD: 0.03 K/UL (ref 0–0.2)
BASOPHILS NFR BLD: 1 % (ref 0–2)
BENZODIAZ UR QL: NEGATIVE
BILIRUB SERPL-MCNC: 0.6 MG/DL (ref 0–1.2)
BILIRUB UR QL STRIP: NEGATIVE
BUN SERPL-MCNC: 9 MG/DL (ref 6–20)
BUPRENORPHINE UR QL: NEGATIVE
CALCIUM SERPL-MCNC: 9.5 MG/DL (ref 8.6–10.2)
CANNABINOIDS UR QL SCN: NEGATIVE
CHLORIDE SERPL-SCNC: 101 MMOL/L (ref 98–107)
CLARITY UR: CLEAR
CO2 SERPL-SCNC: 24 MMOL/L (ref 22–29)
COCAINE UR QL SCN: POSITIVE
COLOR UR: YELLOW
CREAT SERPL-MCNC: 0.9 MG/DL (ref 0.7–1.2)
EKG ATRIAL RATE: 82 BPM
EKG P AXIS: 74 DEGREES
EKG P-R INTERVAL: 146 MS
EKG Q-T INTERVAL: 368 MS
EKG QRS DURATION: 78 MS
EKG QTC CALCULATION (BAZETT): 429 MS
EKG R AXIS: 93 DEGREES
EKG T AXIS: 24 DEGREES
EKG VENTRICULAR RATE: 82 BPM
EOSINOPHIL # BLD: 0.05 K/UL (ref 0.05–0.5)
EOSINOPHILS RELATIVE PERCENT: 1 % (ref 0–6)
ERYTHROCYTE [DISTWIDTH] IN BLOOD BY AUTOMATED COUNT: 14.3 % (ref 11.5–15)
ETHANOLAMINE SERPL-MCNC: <10 MG/DL
FENTANYL UR QL: NEGATIVE
GFR SERPL CREATININE-BSD FRML MDRD: >60 ML/MIN/1.73M2
GLUCOSE SERPL-MCNC: 100 MG/DL (ref 74–99)
GLUCOSE UR STRIP-MCNC: NEGATIVE MG/DL
HCT VFR BLD AUTO: 44.8 % (ref 37–54)
HGB BLD-MCNC: 13.9 G/DL (ref 12.5–16.5)
HGB UR QL STRIP.AUTO: NEGATIVE
IMM GRANULOCYTES # BLD AUTO: <0.03 K/UL (ref 0–0.58)
IMM GRANULOCYTES NFR BLD: 0 % (ref 0–5)
KETONES UR STRIP-MCNC: NEGATIVE MG/DL
LEUKOCYTE ESTERASE UR QL STRIP: NEGATIVE
LYMPHOCYTES NFR BLD: 1.48 K/UL (ref 1.5–4)
LYMPHOCYTES RELATIVE PERCENT: 25 % (ref 20–42)
MCH RBC QN AUTO: 23.6 PG (ref 26–35)
MCHC RBC AUTO-ENTMCNC: 31 G/DL (ref 32–34.5)
MCV RBC AUTO: 76.1 FL (ref 80–99.9)
METHADONE UR QL: NEGATIVE
MONOCYTES NFR BLD: 0.51 K/UL (ref 0.1–0.95)
MONOCYTES NFR BLD: 9 % (ref 2–12)
NEUTROPHILS NFR BLD: 65 % (ref 43–80)
NEUTS SEG NFR BLD: 3.83 K/UL (ref 1.8–7.3)
NITRITE UR QL STRIP: NEGATIVE
OPIATES UR QL SCN: NEGATIVE
OXYCODONE UR QL SCN: NEGATIVE
PCP UR QL SCN: NEGATIVE
PH UR STRIP: 7 [PH] (ref 5–9)
PLATELET # BLD AUTO: 316 K/UL (ref 130–450)
PMV BLD AUTO: 9.1 FL (ref 7–12)
POTASSIUM SERPL-SCNC: 3.9 MMOL/L (ref 3.5–5)
PROT SERPL-MCNC: 8 G/DL (ref 6.4–8.3)
PROT UR STRIP-MCNC: NEGATIVE MG/DL
RBC # BLD AUTO: 5.89 M/UL (ref 3.8–5.8)
RBC #/AREA URNS HPF: NORMAL /HPF
SALICYLATES SERPL-MCNC: <0.3 MG/DL (ref 0–30)
SODIUM SERPL-SCNC: 136 MMOL/L (ref 132–146)
SP GR UR STRIP: 1.01 (ref 1–1.03)
TEST INFORMATION: ABNORMAL
TOXIC TRICYCLIC SC,BLOOD: NEGATIVE
UROBILINOGEN UR STRIP-ACNC: 0.2 EU/DL (ref 0–1)
WBC #/AREA URNS HPF: NORMAL /HPF
WBC OTHER # BLD: 5.9 K/UL (ref 4.5–11.5)

## 2024-01-16 PROCEDURE — 80053 COMPREHEN METABOLIC PANEL: CPT

## 2024-01-16 PROCEDURE — 85025 COMPLETE CBC W/AUTO DIFF WBC: CPT

## 2024-01-16 PROCEDURE — 99284 EMERGENCY DEPT VISIT MOD MDM: CPT

## 2024-01-16 PROCEDURE — 80179 DRUG ASSAY SALICYLATE: CPT

## 2024-01-16 PROCEDURE — 81001 URINALYSIS AUTO W/SCOPE: CPT

## 2024-01-16 PROCEDURE — G0480 DRUG TEST DEF 1-7 CLASSES: HCPCS

## 2024-01-16 PROCEDURE — 80307 DRUG TEST PRSMV CHEM ANLYZR: CPT

## 2024-01-16 PROCEDURE — 93005 ELECTROCARDIOGRAM TRACING: CPT | Performed by: PHYSICIAN ASSISTANT

## 2024-01-16 PROCEDURE — 93010 ELECTROCARDIOGRAM REPORT: CPT | Performed by: INTERNAL MEDICINE

## 2024-01-16 PROCEDURE — 80143 DRUG ASSAY ACETAMINOPHEN: CPT

## 2024-01-16 RX ORDER — ACETAMINOPHEN 500 MG
1000 TABLET ORAL 2 TIMES DAILY
Qty: 40 TABLET | Refills: 0 | Status: SHIPPED | OUTPATIENT
Start: 2024-01-16 | End: 2024-01-26

## 2024-01-16 SDOH — ECONOMIC STABILITY - HOUSING INSECURITY: HOMELESSNESS UNSPECIFIED: Z59.00

## 2024-01-16 ASSESSMENT — LIFESTYLE VARIABLES: HOW OFTEN DO YOU HAVE A DRINK CONTAINING ALCOHOL: NEVER

## 2024-01-16 ASSESSMENT — PAIN - FUNCTIONAL ASSESSMENT: PAIN_FUNCTIONAL_ASSESSMENT: NONE - DENIES PAIN

## 2024-01-16 NOTE — ED NOTES
Spoke to the pt regarding plan - he stated that he is aware that following medical serafin the pt will be referred back to the CSU.

## 2024-01-16 NOTE — ED NOTES
Call to [facility] and they stated that they have a bed for the pt- they wanted a pic ID- mugshot and facesheet sent.  Accepted per Sol Griffin.  Pt will go to [address] [place] [id number].

## 2024-01-16 NOTE — ED NOTES
Behavioral Health Crisis Assessment      Chief Complaint:     \"Wants to sign into crisis unit, got \"into it\" with roomate and got kicked out\"    Mental Status Exam:     Patient is calm, cooperative. Patient mood is euthymic, affect congruent. Patient thought process is clear, speech clear. Patient denies A/V hallucinations - He does not appear to be internally stimulated.    Legal Status:  [x] Voluntary:  [] Involuntary, Issued by:    Gender:  [x] Male [] Female [] Transgender  [] Other    Sexual Orientation:  [x] Heterosexual [] Homosexual [] Bisexual [] Other    Brief Clinical Summary:    Patient is a 43 year old, male presenting to ER for psychiatric evaluation. Patient reports that he wanted to go to the crisis unit for \"2 weeks\" until he gets paid and can go to the [facility]. Patient reports that his roommate kicked him out because patient is caring for the roommate's brother's dog. Patient reports his roommate wants to dog out, so he put the patient out as well. Patient reports that he just needs somewhere to go. Patient reports that he has lived there for 1 year and helps pay rent. Navigator inquired if patient is aware that an eviction process would have to be initiated to remove him from his residence. Patient reports that his friends are like family and that he wants to work on his own independent housing.    Patient denies any suicidal or homicidal ideation.     Patient reports a mental health hx of schizophrenia, not actively in outpatient mental health treatment nor taking medications. Patient reports he only followed up with a therapist briefly and that he was told he just needed to stay away from the wrong crowd of people.  Patient last psychiatric hospitalization was 12/21/2021.    Patient reports ok sleep, ok appetite, and denies any feelings of hopelessness/helplessness. Patient denies any feelings of paranoia.    Patient denies drug use - Patient UDS positive for cocaine.     Collateral  Information:    Risk Factors:    Mental Health Dx  Not active with an outpatient provider  Homelessness    Protective Factors:    Help-Seeking Behavior    Suicidal Ideations:  [] Reports:    [] Past [] Present   [x] Denies    Suicide Attempts:  [] Reports:   [x] Denies    C-SSRS Screening Completed by RN: Current Suicide Risk:  [x] No Risk [] Low [] Moderate [] High    Homicidal Ideations:  [] Reports:   [] Past [] Present   [x] Denies     Self Injurious/Self Mutilation Behaviors:  [] Reports:    [] Past [] Present   [x] Denies    Hallucinations/Delusions:  [] Reports:   [x] Denies     Substance Use/Alcohol Use/Addiction:  [] Reports:   [x] Denies However, positive for cocaine  [x] SBIRT Screen Complete.     Current or Past Substance Abuse Treatment:  [] Yes, When and Where:  [x] No    Current or Past Mental Health Treatment:  [x] Yes, When and Where: [facility] in 2021/2022  [] No    Legal Issues:  []  Yes (Specify)  [x]  No Denies current legal charges    Access to Weapons:  []  Yes (Specify)  [x]  No    Trauma History:  [] Reports:  [x] Denies     Living Situation: Homeless    Employment: Disabled    Education Level: N/A    Violence Risk Screening:        Have you ever thought about hurting someone?   [x]  No  []  Yes (Ask the questions listed below)   When?    Did you follow through with the thoughts?      [x] No     [] Yes- When and what happened?  2.  Have you ever threatened anyone?  [x]  No  []  Yes (Ask the questions listed below)   When and what happened?    Have you ever threatened someone with a gun, knife or other weapon?   [x]  No  []  Yes - When and what happened?  2. Have you ever had an order of protection taken out against you? []  Yes [x]  No  3. Have you ever been arrested due to violence? [x]  Yes []  No  4. Have you ever been cruel to animals? []  Yes [x]  No    After consideration of C-SSRS screening results, C-SSRS assessments, and this professional's assessment the patient's overall suicide

## 2024-01-16 NOTE — ED NOTES
Call to [facility]- [phone].  [phone] [phone]

## 2024-01-16 NOTE — ED PROVIDER NOTES
Independent YARITZA Visit.        [facility] EMERGENCY DEPARTMENT  ED  Encounter Note  Admit Date/RoomTime: 2024  8:04 AM  ED Room: [facility]  NAME: Salazar Miles  : 1980  MRN: [id number]  PCP: No primary care provider on file.    CHIEF COMPLAINT     Wants to go to crisis unit. (Patient was in verbal argument with roommate and was kicked out, would like to be placed at crisis unit. )    HISTORY OF PRESENT ILLNESS        Salazar Miles is a 43 y.o. male who presents to the ED by private vehicle requesting to go to the crisis unit. He got into a verbal argument with his roommate and he was kicked out of his home and has no where to live. He talked to the police officers and they suggested going to the crisis center until he is able to get his own place to live. He states that he has no medical complaints bringing him in to be seen today, he has no ongoing medical or psychiatric conditions, and the only medication he takes is an occasional Tylenol.  Patient has no chest pain, shortness of breath, headache, blurry vision, abdominal pain, nausea or vomiting.    REVIEW OF SYSTEMS     Pertinent positives and negatives are stated within HPI, all other systems reviewed and are negative.    Past Medical History:  has a past medical history of Schizophrenia (HCC).  Surgical History:  has no past surgical history on file.  Social History:  reports that he has been smoking cigars. He does not have any smokeless tobacco history on file. He reports that he does not drink alcohol and does not use drugs.  Family History: family history is not on file.   Allergies: Patient has no known allergies.  CURRENT MEDICATIONS       Previous Medications    DIVALPROEX (DEPAKOTE) 500 MG DR TABLET    Take 1 tablet by mouth 2 times daily    NAPROXEN (NAPROSYN) 250 MG TABLET    Take 1 tablet by mouth 2 times daily (with meals) for 5 days    NICOTINE (NICODERM CQ) 21 MG/24HR    Place 1 patch onto the skin  Ur NEGATIVE NEGATIVE    Buprenorphine Urine NEGATIVE NEGATIVE    Test Information       These drug screen results are for medical purposes only and should not be considered definitive or confirmed.   Urinalysis with Microscopic   Result Value Ref Range    Color, UA Yellow Yellow    Turbidity UA Clear Clear    Glucose, Ur NEGATIVE NEGATIVE mg/dL    Bilirubin Urine NEGATIVE NEGATIVE    Ketones, Urine NEGATIVE NEGATIVE mg/dL    Specific Gravity, UA 1.010 1.005 - 1.030    Urine Hgb NEGATIVE NEGATIVE    pH, UA 7.0 5.0 - 9.0    Protein, UA NEGATIVE NEGATIVE mg/dL    Urobilinogen, Urine 0.2 0.0 - 1.0 EU/dL    Nitrite, Urine NEGATIVE NEGATIVE    Leukocyte Esterase, Urine NEGATIVE NEGATIVE    WBC, UA 0 TO 5 0 TO 5 /HPF    RBC, UA 0 TO 2 0 TO 2 /HPF   EKG 12 Lead   Result Value Ref Range    Ventricular Rate 82 BPM    Atrial Rate 82 BPM    P-R Interval 146 ms    QRS Duration 78 ms    Q-T Interval 368 ms    QTc Calculation (Bazett) 429 ms    P Axis 74 degrees    R Axis 93 degrees    T Axis 24 degrees     Imaging:  All Radiology results interpreted by Radiologist unless otherwise noted.  No orders to display       ED COURSE   Vitals:    Vitals:    01/16/24 0745 01/16/24 0800   BP: (!) 139/92 (!) 145/97   Pulse: (!) 106 (!) 106   Resp: 18 18   Temp: 98 °F (36.7 °C)    TempSrc: Temporal    SpO2: 98% 99%       Patient was given the following medications:  Medications - No data to display       PROCEDURES   none      CONSULTS:  IP CONSULT TO SOCIAL WORK  DIFFERENTIAL DX_MDM   MDM:   Social Determinants : None    Records Reviewed : None_ n/a per encounter visit    CC/HPI Summary, DDx, ED Course, and Reassessment:Salazar Miles is a 43 y.o. male who presents to the ED by private vehicle requesting to go to the crisis unit. He got into a verbal argument with his roommate and he was kicked out of his home and has no where to live. He talked to the police officers and they suggested going to the crisis center until he is able to get his

## 2024-01-16 NOTE — DISCHARGE INSTRUCTIONS
DISCHARGE PT TO [facility]  [facility] [id number]